# Patient Record
Sex: MALE | Race: WHITE | Employment: UNEMPLOYED | URBAN - METROPOLITAN AREA
[De-identification: names, ages, dates, MRNs, and addresses within clinical notes are randomized per-mention and may not be internally consistent; named-entity substitution may affect disease eponyms.]

---

## 2017-02-07 ENCOUNTER — OFFICE VISIT (OUTPATIENT)
Dept: PSYCHOLOGY | Facility: CLINIC | Age: 22
End: 2017-02-07
Payer: COMMERCIAL

## 2017-02-07 DIAGNOSIS — F41.1 GENERALIZED ANXIETY DISORDER: Primary | ICD-10-CM

## 2017-02-07 PROCEDURE — 90834 PSYTX W PT 45 MINUTES: CPT | Performed by: MARRIAGE & FAMILY THERAPIST

## 2017-02-07 NOTE — PROGRESS NOTES
Progress Note    Client Name: Keon Davis  Date: February 7, 2017                 Service Type: Individual      Session Start Time: 12:00  Session End Time: 12:45      Session Length: 45 min     Session #: 15     Attendees: Client attended alone    Treatment Plan Last Reviewed: February 7, 2017  SIOBHAN-7 : assessed regularly see flow sheets     DATA      Progress Since Last Session (Related to Symptoms / Goals / Homework):   Symptoms: Improved    Homework: Achieved / completed to satisfaction      Episode of Care Goals: Satisfactory progress - ACTION (Actively working towards change); Intervened by reinforcing change plan / affirming steps taken     Current / Ongoing Stressors and Concerns:   Stressful job - relationship conflict  - hypervigilance     Treatment Objective(s) Addressed in This Session:   Client will learn to anticipate and manage anxiety emotions and thoughts.     Intervention:  Reviewed emotion regulation skills. Client reports he successfully weaned off of his SSRI mediation. He said he has begun doing 10-15 minutes of medication every day and is finding this very helpful in lowering his anxiety symptoms. Processed emotions in session, validated, supportive counseling.     ASSESSMENT: Current Emotional / Mental Status (status of significant symptoms):   Risk status (Self / Other harm or suicidal ideation)   Client denies current fears or concerns for personal safety.   Client denies current or recent suicidal ideation or behaviors.   Client denies current or recent homicidal ideation or behaviors.   Client denies current or recent self injurious behavior or ideation.   Client denies other safety concerns.   A safety and risk management plan has not been developed at this time, however client was given the after-hours number / 911 should there be a change in any of these risk factors.     Appearance:   Appropriate    Eye Contact:   Good     Psychomotor Behavior: Normal    Attitude:   Cooperative    Orientation:   All   Speech    Rate / Production: Normal     Volume:  Normal    Mood:    Anxious    Affect:    Appropriate    Thought Content:  Clear    Thought Form:  Coherent  Logical    Insight:    Good      Medication Review:   No current psychiatric medications prescribed     Medication Compliance:   NA     Changes in Health Issues:   None reported     Chemical Use Review:   Substance Use: Chemical use reviewed, no active concerns identified      Tobacco Use: No current tobacco use.       Collateral Reports Completed:   Not Applicable    PLAN: (Client Tasks / Therapist Tasks / Other)  Client will use skills daily to maintain improvement.      Amrit Purcell MA, LMFT                                                       ________________________________________________________________________                                                 Treatment Plan    Client's Name: Keon Davis  YOB: 1995    Date: February 7, 2017      DSM-V Diagnoses: 300.02 - Generalized Anxiety Disorder  ; V71.09 - No Diagnosis  Psychosocial / Contextual Factors: relational conflict      Referral / Collaboration:  Referral to another professional/service is not indicated at this time..    Anticipated number of session or this episode of care: 26      MeasurableTreatment Goal(s) related to diagnosis / functional impairment(s)   I will know I've met my goal when I learn tools to cope with and lower my anxiety.     Goal 1: Client will experience a reduction in SIOBHAN-7 scores to 5 or below   Objective #A (Client Action)   Client will learn to anticipate and manage anxiety emotions and thoughts. .   Status: Continued: February 7, 2017  Intervention(s)   Therapist will teach acceptance strategies and emotion regulation skills to be more flexible psychologically when the anxiety is present. .   Objective #B   Client will learn to identify negative thoughts that  are present, related to anxiety.   Status: Continued: February 7, 2017  Intervention(s)   Therapist will 1. teach about cognitive distortions and 2. encourage client to daily identify one and write it down.  Objective #C   Client will learn ways to manage the thought distortions that are present.   Status: Continued: February 7, 2017  Intervention(s)   Therapist will 1. teach cognitive restructuring techniques for catastrophic thoughts and 2. diffusion for healthly anxiety thoughts.   Client has reviewed and agreed to the above plan.     Client has reviewed and agreed to the above plan.      Amrit Purcell MA, LMFT Continued: February 7, 2017

## 2017-03-21 ENCOUNTER — OFFICE VISIT (OUTPATIENT)
Dept: PSYCHOLOGY | Facility: CLINIC | Age: 22
End: 2017-03-21
Payer: COMMERCIAL

## 2017-03-21 DIAGNOSIS — F41.1 GENERALIZED ANXIETY DISORDER: Primary | ICD-10-CM

## 2017-03-21 PROCEDURE — 90834 PSYTX W PT 45 MINUTES: CPT | Performed by: MARRIAGE & FAMILY THERAPIST

## 2017-03-21 ASSESSMENT — ANXIETY QUESTIONNAIRES
1. FEELING NERVOUS, ANXIOUS, OR ON EDGE: SEVERAL DAYS
2. NOT BEING ABLE TO STOP OR CONTROL WORRYING: SEVERAL DAYS
3. WORRYING TOO MUCH ABOUT DIFFERENT THINGS: SEVERAL DAYS
6. BECOMING EASILY ANNOYED OR IRRITABLE: NOT AT ALL
IF YOU CHECKED OFF ANY PROBLEMS ON THIS QUESTIONNAIRE, HOW DIFFICULT HAVE THESE PROBLEMS MADE IT FOR YOU TO DO YOUR WORK, TAKE CARE OF THINGS AT HOME, OR GET ALONG WITH OTHER PEOPLE: SOMEWHAT DIFFICULT
7. FEELING AFRAID AS IF SOMETHING AWFUL MIGHT HAPPEN: SEVERAL DAYS
GAD7 TOTAL SCORE: 6
5. BEING SO RESTLESS THAT IT IS HARD TO SIT STILL: SEVERAL DAYS

## 2017-03-21 ASSESSMENT — PATIENT HEALTH QUESTIONNAIRE - PHQ9: 5. POOR APPETITE OR OVEREATING: SEVERAL DAYS

## 2017-03-21 NOTE — PROGRESS NOTES
Progress Note    Client Name: Keon Davis  Date: March 21, 2017                  Service Type: Individual      Session Start Time: 12:00  Session End Time: 12:45      Session Length: 45 min     Session #: 16     Attendees: Client attended alone    Treatment Plan Last Reviewed: February 7, 2017  SIOBHAN-7 : assessed regularly see flow sheets     DATA      Progress Since Last Session (Related to Symptoms / Goals / Homework):   Symptoms: Improved    Homework: Achieved / completed to satisfaction      Episode of Care Goals: Satisfactory progress - MAINTENANCE (Working to maintain change, with risk of relapse); Intervened by continuing to positively reinforce healthy behavior choice      Current / Ongoing Stressors and Concerns:   Stressful job - relationship conflict  - hypervigilance     Treatment Objective(s) Addressed in This Session:   Client will learn to anticipate and manage anxiety emotions and thoughts.     Intervention:  Reviewed self-care goals. Client reports he is generally quite improved. He says he is stressed by work (very busy) and his coming wedding in October. He says his self-care is strong, with daily mediation for the past 2 months. Client will return in 4 weks to maintain improvement. Processed emotions in session, validated, supportive counseling.     ASSESSMENT: Current Emotional / Mental Status (status of significant symptoms):   Risk status (Self / Other harm or suicidal ideation)   Client denies current fears or concerns for personal safety.   Client denies current or recent suicidal ideation or behaviors.   Client denies current or recent homicidal ideation or behaviors.   Client denies current or recent self injurious behavior or ideation.   Client denies other safety concerns.   A safety and risk management plan has not been developed at this time, however client was given the after-hours number / 911 should there be a change in any of  these risk factors.     Appearance:   Appropriate    Eye Contact:   Good    Psychomotor Behavior: Normal    Attitude:   Cooperative    Orientation:   All   Speech    Rate / Production: Normal     Volume:  Normal    Mood:    Anxious    Affect:    Appropriate    Thought Content:  Clear    Thought Form:  Coherent  Logical    Insight:    Good      Medication Review:   No current psychiatric medications prescribed     Medication Compliance:   NA     Changes in Health Issues:   None reported     Chemical Use Review:   Substance Use: Chemical use reviewed, no active concerns identified      Tobacco Use: No current tobacco use.       Collateral Reports Completed:   Not Applicable    PLAN: (Client Tasks / Therapist Tasks / Other)  Client will continue strong self-care performance, including meditation.        Amrit Purcell MA, LMFT                                                       ________________________________________________________________________                                                 Treatment Plan    Client's Name: Keon Davis  YOB: 1995    Date: February 7, 2017      DSM-V Diagnoses: 300.02 - Generalized Anxiety Disorder  ; V71.09 - No Diagnosis  Psychosocial / Contextual Factors: relational conflict      Referral / Collaboration:  Referral to another professional/service is not indicated at this time..    Anticipated number of session or this episode of care: 26      MeasurableTreatment Goal(s) related to diagnosis / functional impairment(s)   I will know I've met my goal when I learn tools to cope with and lower my anxiety.     Goal 1: Client will experience a reduction in SIOBHAN-7 scores to 5 or below   Objective #A (Client Action)   Client will learn to anticipate and manage anxiety emotions and thoughts. .   Status: Continued: February 7, 2017  Intervention(s)   Therapist will teach acceptance strategies and emotion regulation skills to be more flexible psychologically when  the anxiety is present. .   Objective #B   Client will learn to identify negative thoughts that are present, related to anxiety.   Status: Continued: February 7, 2017  Intervention(s)   Therapist will 1. teach about cognitive distortions and 2. encourage client to daily identify one and write it down.  Objective #C   Client will learn ways to manage the thought distortions that are present.   Status: Continued: February 7, 2017  Intervention(s)   Therapist will 1. teach cognitive restructuring techniques for catastrophic thoughts and 2. diffusion for healthly anxiety thoughts.   Client has reviewed and agreed to the above plan.     Client has reviewed and agreed to the above plan.      Amrit Purcell MA, LMFT Continued: February 7, 2017

## 2017-03-22 ASSESSMENT — ANXIETY QUESTIONNAIRES: GAD7 TOTAL SCORE: 6

## 2017-04-18 ENCOUNTER — OFFICE VISIT (OUTPATIENT)
Dept: PSYCHOLOGY | Facility: CLINIC | Age: 22
End: 2017-04-18
Payer: COMMERCIAL

## 2017-04-18 DIAGNOSIS — F41.1 GENERALIZED ANXIETY DISORDER: Primary | ICD-10-CM

## 2017-04-18 PROCEDURE — 90834 PSYTX W PT 45 MINUTES: CPT | Performed by: MARRIAGE & FAMILY THERAPIST

## 2017-04-18 NOTE — MR AVS SNAPSHOT
MRN:[id number]                      After Visit Summary   4/18/2017    Keon Davis    MRN: [id number]           Visit Information        Provider Department      4/18/2017 12:00 PM Donald Purcell [facility]      Your next 10 appointments already scheduled     Apr 20, 2017 10:45 AM CDT   SHORT with EMRE Wolf CNP   [facility] ([facility])    [address] [place] [id number]   [phone]            May 02, 2017 12:00 PM CDT   Return Visit with Donald Purcell   [facility] ([facility])    [address] [place] [address] [address] [place] [address] [phone]            May 16, 2017 12:00 PM CDT   Return Visit with Donald Purcell   [facility] ([facility])    [address] [place] [address] [address] [place] [address] [phone]            May 30, 2017 12:00 PM CDT   Return Visit with Donald Purcell   [facility] ([facility])    [address] [place] [address] [address] [place] [address] [phone]            Jun 13, 2017 12:00 PM CDT   Return Visit with Donald Purcell   [facility] ([facility])    [address] [place] [address] [address] [place] [address]   [phone]            Jun 27, 2017 12:00 PM CDT   Return Visit with Donald Purcell   [facility] ([facility])    [address] [place] [address] [address] [place] [address] [phone]              MyChart Information     [facility] gives you secure access to your electronic health record. If you see a primary care provider, you can also send messages to your care team and make appointments. If you have questions, please call your primary care clinic.  If you do not have a primary care provider, please call  [phone] and they will assist you.        Care EveryWhere ID     This is your Care EveryWhere ID. This could be used by other organizations to access your [facility] medical records  [id number]

## 2017-04-18 NOTE — PROGRESS NOTES
Progress Note    Client Name: Keon Davis  Date: April 18, 2017                   Service Type: Individual      Session Start Time: 12:00  Session End Time: 12:45      Session Length: 45 min     Session #: 17     Attendees: Client attended alone    Treatment Plan Last Reviewed: February 7, 2017  SIOBHAN-7 : assessed regularly see flow sheets     DATA      Progress Since Last Session (Related to Symptoms / Goals / Homework):   Symptoms: Improved    Homework: Achieved / completed to satisfaction      Episode of Care Goals: Satisfactory progress - MAINTENANCE (Working to maintain change, with risk of relapse); Intervened by continuing to positively reinforce healthy behavior choice      Current / Ongoing Stressors and Concerns:   Stressful job - relationship conflict  - hypervigilance     Treatment Objective(s) Addressed in This Session:   Client will learn to anticipate and manage anxiety emotions and thoughts.     Intervention:  Reviewed interpersonal effectiveness skills. Client reports he is feeling some low mood symptoms, which are probably related to a two week illness (cold). In addition, he says his best friend has been angry with him for not spending as much time with him. We discussed social relationship and time management goals. Processed emotions in session, validated, supportive counseling.     ASSESSMENT: Current Emotional / Mental Status (status of significant symptoms):   Risk status (Self / Other harm or suicidal ideation)   Client denies current fears or concerns for personal safety.   Client denies current or recent suicidal ideation or behaviors.   Client denies current or recent homicidal ideation or behaviors.   Client denies current or recent self injurious behavior or ideation.   Client denies other safety concerns.   A safety and risk management plan has not been developed at this time, however client was given the after-hours number / 911  should there be a change in any of these risk factors.     Appearance:   Appropriate    Eye Contact:   Good    Psychomotor Behavior: Normal    Attitude:   Cooperative    Orientation:   All   Speech    Rate / Production: Normal     Volume:  Normal    Mood:    Anxious    Affect:    Appropriate    Thought Content:  Clear    Thought Form:  Coherent  Logical    Insight:    Good      Medication Review:   No current psychiatric medications prescribed     Medication Compliance:   NA     Changes in Health Issues:   None reported     Chemical Use Review:   Substance Use: Chemical use reviewed, no active concerns identified      Tobacco Use: No current tobacco use.       Collateral Reports Completed:   Not Applicable    PLAN: (Client Tasks / Therapist Tasks / Other)  Client will continue strong self-care performance, including meditation.        Amrit Purcell MA, LMFT                                                       ________________________________________________________________________                                                 Treatment Plan    Client's Name: Keon Davis  YOB: 1995    Date: February 7, 2017      DSM-V Diagnoses: 300.02 - Generalized Anxiety Disorder  ; V71.09 - No Diagnosis  Psychosocial / Contextual Factors: relational conflict      Referral / Collaboration:  Referral to another professional/service is not indicated at this time..    Anticipated number of session or this episode of care: 26      MeasurableTreatment Goal(s) related to diagnosis / functional impairment(s)   I will know I've met my goal when I learn tools to cope with and lower my anxiety.     Goal 1: Client will experience a reduction in SIOBHAN-7 scores to 5 or below   Objective #A (Client Action)   Client will learn to anticipate and manage anxiety emotions and thoughts. .   Status: Continued: February 7, 2017  Intervention(s)   Therapist will teach acceptance strategies and emotion regulation skills to be  more flexible psychologically when the anxiety is present. .   Objective #B   Client will learn to identify negative thoughts that are present, related to anxiety.   Status: Continued: February 7, 2017  Intervention(s)   Therapist will 1. teach about cognitive distortions and 2. encourage client to daily identify one and write it down.  Objective #C   Client will learn ways to manage the thought distortions that are present.   Status: Continued: February 7, 2017  Intervention(s)   Therapist will 1. teach cognitive restructuring techniques for catastrophic thoughts and 2. diffusion for healthly anxiety thoughts.   Client has reviewed and agreed to the above plan.     Client has reviewed and agreed to the above plan.      Amrit Purcell MA, LMFT Continued: February 7, 2017

## 2017-04-19 NOTE — PROGRESS NOTES
SUBJECTIVE:                                                    Keon Davis is a 21 year old male who presents to clinic today for the following health issues:    Acute Illness   Acute illness concerns: sore throat  Onset: 2 weeks    Fever: no    Chills/Sweats: YES- just on Sunday    Headache (location?): YES    Sinus Pressure:no    Conjunctivitis:  no    Ear Pain: no    Rhinorrhea: YES- a little bit    Congestion: YES- kind of    Sore Throat: YES     Cough: YES    Wheeze: YES- a little bit    Decreased Appetite: YES    Nausea: no    Vomiting: no    Diarrhea:  no    Dysuria/Freq.: no    Fatigue/Achiness: YES- more achy    Sick/Strep Exposure: no      Therapies Tried and outcome: Dayquil and Nyquil, little relief      -------------------------------------    Problem list and histories reviewed & adjusted, as indicated.  Additional history: as documented    Patient Active Problem List   Diagnosis     CARDIOVASCULAR SCREENING; LDL GOAL LESS THAN 160     Adjustment reaction     SIOBHAN (generalized anxiety disorder)     No past surgical history on file.    Social History   Substance Use Topics     Smoking status: Never Smoker     Smokeless tobacco: Never Used     Alcohol use Yes      Comment: 5 beers per week     Family History   Problem Relation Age of Onset     CANCER Paternal Grandmother          Current Outpatient Prescriptions   Medication Sig Dispense Refill     benzonatate (TESSALON) 200 MG capsule Take 1 capsule (200 mg) by mouth 3 times daily as needed for cough 21 capsule 0     fluticasone (FLONASE) 50 MCG/ACT nasal spray Spray 2 sprays into both nostrils daily       citalopram (CELEXA) 40 MG tablet Take one pill daily by mouth (Patient not taking: Reported on 4/20/2017) 30 tablet 3     fexofenadine (ALLEGRA) 180 MG tablet Take 180 mg by mouth daily Reported on 4/20/2017       DiphenhydrAMINE HCl (BENADRYL ALLERGY PO) Reported on 4/20/2017         Reviewed and updated as needed this visit by clinical  "staff       Reviewed and updated as needed this visit by Provider         ROS:  Constitutional, HEENT, cardiovascular, pulmonary, gi and gu systems are negative, except as otherwise noted.    OBJECTIVE:                                                    /70  Pulse 85  Temp 98.3  F (36.8  C) (Oral)  Ht 5' 9\" (1.753 m)  Wt 151 lb 4.8 oz (68.6 kg)  SpO2 100%  BMI 22.34 kg/m2  Body mass index is 22.34 kg/(m^2).  GENERAL: healthy, alert and no distress  HENT: ear canals and TM's normal, nose and mouth without ulcers or lesions  NECK: bilateral anterior cervical adenopathy, no asymmetry, masses, or scars and thyroid normal to palpation  RESP: lungs clear to auscultation - no rales, rhonchi or wheezes  CV: regular rate and rhythm, normal S1 S2, no S3 or S4, no murmur, click or rub, no peripheral edema and peripheral pulses strong    Diagnostic Test Results:  Results for orders placed or performed in visit on 04/20/17 (from the past 24 hour(s))   Strep, Rapid Screen   Result Value Ref Range    Specimen Description Throat     Rapid Strep A Screen       NEGATIVE: No Group A streptococcal antigen detected by immunoassay, await   culture report.      Micro Report Status FINAL 04/20/2017         ASSESSMENT/PLAN:                                                      Problem List Items Addressed This Visit     None      Visit Diagnoses     Throat pain    -  Primary    Relevant Orders    Strep, Rapid Screen (Completed)    Beta strep group A culture (Completed)    Cough        Relevant Medications    benzonatate (TESSALON) 200 MG capsule         Emphasized rest and fluids  EMRE Wolf CNP  [facility]    "

## 2017-04-20 ENCOUNTER — OFFICE VISIT (OUTPATIENT)
Dept: FAMILY MEDICINE | Facility: CLINIC | Age: 22
End: 2017-04-20
Payer: COMMERCIAL

## 2017-04-20 VITALS
OXYGEN SATURATION: 100 % | HEIGHT: 69 IN | BODY MASS INDEX: 22.41 KG/M2 | HEART RATE: 85 BPM | WEIGHT: 151.3 LBS | DIASTOLIC BLOOD PRESSURE: 70 MMHG | TEMPERATURE: 98.3 F | SYSTOLIC BLOOD PRESSURE: 132 MMHG

## 2017-04-20 DIAGNOSIS — R05.9 COUGH: ICD-10-CM

## 2017-04-20 DIAGNOSIS — R07.0 THROAT PAIN: Primary | ICD-10-CM

## 2017-04-20 LAB
DEPRECATED S PYO AG THROAT QL EIA: NORMAL
MICRO REPORT STATUS: NORMAL
SPECIMEN SOURCE: NORMAL

## 2017-04-20 PROCEDURE — 99213 OFFICE O/P EST LOW 20 MIN: CPT | Performed by: NURSE PRACTITIONER

## 2017-04-20 PROCEDURE — 87081 CULTURE SCREEN ONLY: CPT | Performed by: NURSE PRACTITIONER

## 2017-04-20 PROCEDURE — 87880 STREP A ASSAY W/OPTIC: CPT | Performed by: NURSE PRACTITIONER

## 2017-04-20 RX ORDER — BENZONATATE 200 MG/1
200 CAPSULE ORAL 3 TIMES DAILY PRN
Qty: 21 CAPSULE | Refills: 0 | Status: SHIPPED | OUTPATIENT
Start: 2017-04-20 | End: 2024-01-15

## 2017-04-20 NOTE — NURSING NOTE
"Chief Complaint   Patient presents with     Pharyngitis       Initial /70  Pulse 85  Temp 98.3  F (36.8  C) (Oral)  Ht 5' 9\" (1.753 m)  Wt 151 lb 4.8 oz (68.6 kg)  SpO2 100%  BMI 22.34 kg/m2 Estimated body mass index is 22.34 kg/(m^2) as calculated from the following:    Height as of this encounter: 5' 9\" (1.753 m).    Weight as of this encounter: 151 lb 4.8 oz (68.6 kg).  Medication Reconciliation: complete     Lizzie Hills MA      "

## 2017-04-20 NOTE — MR AVS SNAPSHOT
After Visit Summary   4/20/2017    Keon Davis    MRN: [id number]           Patient Information     Date Of Birth          1995        Visit Information        Provider Department      4/20/2017 10:45 AM Miriam Draper APRN [facility]        Today's Diagnoses     Throat pain    -  1    Cough           Follow-ups after your visit        Your next 10 appointments already scheduled     May 02, 2017 12:00 PM CDT   Return Visit with Donald Purcell   [facility] ([facility])    [address] [place] [address] [address] [place] [address]   [phone]            May 16, 2017 12:00 PM CDT   Return Visit with Donald Purcell   [facility] ([facility])    [address] [place] [address] [address] [place] [address] [phone]            May 30, 2017 12:00 PM CDT   Return Visit with Donald Purcell   [facility] ([facility])    [address] [place] [address] [address] [place] [id number] [phone]            Jun 13, 2017 12:00 PM CDT   Return Visit with Donald Purcell   [facility] ([facility])    [address] [place] [address] [address] [place] [id number] [phone]            Jun 27, 2017 12:00 PM CDT   Return Visit with Donald Purcell   [facility] ([facility])    [address] [place] [address] [address] [place] [id number] [phone]              Who to contact     If you have questions or need follow up information about today's clinic visit or your schedule please contact [facility] directly at [phone].  Normal or non-critical lab and imaging results will be communicated to you by MyChart, letter or phone within 4 business days after the clinic has received the results. If you do not hear from us within 7 days, please contact the  "clinic through [employer] or phone. If you have a critical or abnormal lab result, we will notify you by phone as soon as possible.  Submit refill requests through [employer] or call your pharmacy and they will forward the refill request to us. Please allow 3 business days for your refill to be completed.          Additional Information About Your Visit        [employer] Information     [employer] gives you secure access to your electronic health record. If you see a primary care provider, you can also send messages to your care team and make appointments. If you have questions, please call your primary care clinic.  If you do not have a primary care provider, please call [phone] and they will assist you.        Care EveryWhere ID     This is your Care EveryWhere ID. This could be used by other organizations to access your [place] medical records  [id number]        Your Vitals Were     Pulse Temperature Height Pulse Oximetry BMI (Body Mass Index)       85 98.3  F (36.8  C) (Oral) 5' 9\" (1.753 m) 100% 22.34 kg/m2        Blood Pressure from Last 3 Encounters:   04/20/17 132/70   10/11/16 125/65   07/14/16 130/73    Weight from Last 3 Encounters:   04/20/17 151 lb 4.8 oz (68.6 kg)   10/11/16 146 lb (66.2 kg)   07/14/16 148 lb 8 oz (67.4 kg)              We Performed the Following     Beta strep group A culture     Strep, Rapid Screen          Today's Medication Changes          These changes are accurate as of: 4/20/17 11:38 AM.  If you have any questions, ask your nurse or doctor.               Start taking these medicines.        Dose/Directions    benzonatate 200 MG capsule   Commonly known as:  TESSALON   Used for:  Cough   Started by:  Miriam Draper APRN CNP        Dose:  200 mg   Take 1 capsule (200 mg) by mouth 3 times daily as needed for cough   Quantity:  21 capsule   Refills:  0            Where to get your medicines      These medications were sent to [facility]/pharmacy #[id number] - [place], [place] - [address] [place] [address] " [place] [id number] [facility] [address]     Phone:  [phone]     benzonatate 200 MG capsule                Primary Care Provider    None Specified       No primary provider on file.        Thank you!     Thank you for choosing [facility]  for your care. Our goal is always to provide you with excellent care. Hearing back from our patients is one way we can continue to improve our services. Please take a few minutes to complete the written survey that you may receive in the mail after your visit with us. Thank you!             Your Updated Medication List - Protect others around you: Learn how to safely use, store and throw away your medicines at www.disposemymeds.org.          This list is accurate as of: 4/20/17 11:38 AM.  Always use your most recent med list.                   Brand Name Dispense Instructions for use    BENADRYL ALLERGY PO      Reported on 4/20/2017       benzonatate 200 MG capsule    TESSALON    21 capsule    Take 1 capsule (200 mg) by mouth 3 times daily as needed for cough       citalopram 40 MG tablet    celeXA    30 tablet    Take one pill daily by mouth       fexofenadine 180 MG tablet    ALLEGRA     Take 180 mg by mouth daily Reported on 4/20/2017       fluticasone 50 MCG/ACT spray    FLONASE     Spray 2 sprays into both nostrils daily

## 2017-04-21 LAB
BACTERIA SPEC CULT: NORMAL
MICRO REPORT STATUS: NORMAL
SPECIMEN SOURCE: NORMAL

## 2017-05-16 ENCOUNTER — OFFICE VISIT (OUTPATIENT)
Dept: PSYCHOLOGY | Facility: CLINIC | Age: 22
End: 2017-05-16
Payer: COMMERCIAL

## 2017-05-16 DIAGNOSIS — F41.1 GENERALIZED ANXIETY DISORDER: Primary | ICD-10-CM

## 2017-05-16 PROCEDURE — 90834 PSYTX W PT 45 MINUTES: CPT | Performed by: MARRIAGE & FAMILY THERAPIST

## 2017-05-16 NOTE — PROGRESS NOTES
Progress Note    Client Name: Keon Davis  Date: May 16, 2017                    Service Type: Individual      Session Start Time: 12:00  Session End Time: 12:45      Session Length: 45 min     Session #: 18     Attendees: Client attended alone    Treatment Plan Last Reviewed: February 7, 2017  SIOBHAN-7 : assessed regularly see flow sheets     DATA      Progress Since Last Session (Related to Symptoms / Goals / Homework):   Symptoms: Improved    Homework: Achieved / completed to satisfaction      Episode of Care Goals: Satisfactory progress - MAINTENANCE (Working to maintain change, with risk of relapse); Intervened by continuing to positively reinforce healthy behavior choice      Current / Ongoing Stressors and Concerns:   Stressful job - relationship conflict  - hypervigilance     Treatment Objective(s) Addressed in This Session:   Client will learn to anticipate and manage anxiety emotions and thoughts.     Intervention:  Reviewed skills and self-care that have been helpful in gaining and maintaining improvement.. Client reports he has meditating now for 100 days and has found this very effective in lowering his anxiety. Processed emotions in session, validated, supportive counseling.     ASSESSMENT: Current Emotional / Mental Status (status of significant symptoms):   Risk status (Self / Other harm or suicidal ideation)   Client denies current fears or concerns for personal safety.   Client denies current or recent suicidal ideation or behaviors.   Client denies current or recent homicidal ideation or behaviors.   Client denies current or recent self injurious behavior or ideation.   Client denies other safety concerns.   A safety and risk management plan has not been developed at this time, however client was given the after-hours number / 911 should there be a change in any of these risk factors.     Appearance:   Appropriate    Eye Contact:   Good     Psychomotor Behavior: Normal    Attitude:   Cooperative    Orientation:   All   Speech    Rate / Production: Normal     Volume:  Normal    Mood:    Anxious    Affect:    Appropriate    Thought Content:  Clear    Thought Form:  Coherent  Logical    Insight:    Good      Medication Review:   No current psychiatric medications prescribed     Medication Compliance:   NA     Changes in Health Issues:   None reported     Chemical Use Review:   Substance Use: Chemical use reviewed, no active concerns identified      Tobacco Use: No current tobacco use.       Collateral Reports Completed:   Not Applicable    PLAN: (Client Tasks / Therapist Tasks / Other)  Client will continue strong self-care performance, including meditation.        Amrit Purcell MA, LMFT                                                       ________________________________________________________________________                                                 Treatment Plan    Client's Name: Keon Davis  YOB: 1995    Date: February 7, 2017      DSM-V Diagnoses: 300.02 - Generalized Anxiety Disorder  ; V71.09 - No Diagnosis  Psychosocial / Contextual Factors: relational conflict      Referral / Collaboration:  Referral to another professional/service is not indicated at this time..    Anticipated number of session or this episode of care: 26      MeasurableTreatment Goal(s) related to diagnosis / functional impairment(s)   I will know I've met my goal when I learn tools to cope with and lower my anxiety.     Goal 1: Client will experience a reduction in SIOBHAN-7 scores to 5 or below   Objective #A (Client Action)   Client will learn to anticipate and manage anxiety emotions and thoughts. .   Status: Continued: February 7, 2017  Intervention(s)   Therapist will teach acceptance strategies and emotion regulation skills to be more flexible psychologically when the anxiety is present. .   Objective #B   Client will learn to identify  negative thoughts that are present, related to anxiety.   Status: Continued: February 7, 2017  Intervention(s)   Therapist will 1. teach about cognitive distortions and 2. encourage client to daily identify one and write it down.  Objective #C   Client will learn ways to manage the thought distortions that are present.   Status: Continued: February 7, 2017  Intervention(s)   Therapist will 1. teach cognitive restructuring techniques for catastrophic thoughts and 2. diffusion for healthly anxiety thoughts.   Client has reviewed and agreed to the above plan.     Client has reviewed and agreed to the above plan.      Amrit Purcell MA, LMFT Continued: February 7, 2017

## 2017-05-16 NOTE — MR AVS SNAPSHOT
MRN:[id number]                      After Visit Summary   5/16/2017    Keon aDvis    MRN: [id number]           Visit Information        Provider Department      5/16/2017 12:00 PM Donald Purcell [facility]      Your next 10 appointments already scheduled     May 30, 2017 12:00 PM CDT   Return Visit with Donald Purcell   [facility] ([facility])    [address] [place] [address] [address] [place] [address]   [phone]            Jun 13, 2017 12:00 PM CDT   Return Visit with Donald Purcell   [facility] ([facility])    [address] [place] [address] [place] [address]   [phone]            Jun 27, 2017 12:00 PM CDT   Return Visit with Donald Purcell   [facility] ([facility])    [address] [place] [address] [place] [address] [phone]            Jul 11, 2017 12:00 PM CDT   Return Visit with Donald Purcell   [facility] ([facility])    [address] [place] [address] [address] [place] [address]   [phone]            Jul 25, 2017 12:00 PM CDT   Return Visit with Donald Purcell   [facility] ([facility])    [address] [place] [address] [address] [place] [address]   [phone]              MyChart Information     [employer] gives you secure access to your electronic health record. If you see a primary care provider, you can also send messages to your care team and make appointments. If you have questions, please call your primary care clinic.  If you do not have a primary care provider, please call [phone] and they will assist you.        Care EveryWhere ID     This is your Care EveryWhere ID. This could be used by other organizations to access your [place] medical records  [id number]

## 2017-06-27 ENCOUNTER — OFFICE VISIT (OUTPATIENT)
Dept: PSYCHOLOGY | Facility: CLINIC | Age: 22
End: 2017-06-27
Payer: COMMERCIAL

## 2017-06-27 DIAGNOSIS — F41.1 GENERALIZED ANXIETY DISORDER: Primary | ICD-10-CM

## 2017-06-27 PROCEDURE — 90834 PSYTX W PT 45 MINUTES: CPT | Performed by: MARRIAGE & FAMILY THERAPIST

## 2017-06-27 ASSESSMENT — ANXIETY QUESTIONNAIRES
1. FEELING NERVOUS, ANXIOUS, OR ON EDGE: SEVERAL DAYS
IF YOU CHECKED OFF ANY PROBLEMS ON THIS QUESTIONNAIRE, HOW DIFFICULT HAVE THESE PROBLEMS MADE IT FOR YOU TO DO YOUR WORK, TAKE CARE OF THINGS AT HOME, OR GET ALONG WITH OTHER PEOPLE: SOMEWHAT DIFFICULT
GAD7 TOTAL SCORE: 5
5. BEING SO RESTLESS THAT IT IS HARD TO SIT STILL: NOT AT ALL
2. NOT BEING ABLE TO STOP OR CONTROL WORRYING: SEVERAL DAYS
6. BECOMING EASILY ANNOYED OR IRRITABLE: SEVERAL DAYS
7. FEELING AFRAID AS IF SOMETHING AWFUL MIGHT HAPPEN: NOT AT ALL
3. WORRYING TOO MUCH ABOUT DIFFERENT THINGS: SEVERAL DAYS

## 2017-06-27 ASSESSMENT — PATIENT HEALTH QUESTIONNAIRE - PHQ9: 5. POOR APPETITE OR OVEREATING: SEVERAL DAYS

## 2017-06-27 NOTE — MR AVS SNAPSHOT
MRN:[id number]                      After Visit Summary   6/27/2017    Keon Davis    MRN: [id number]           Visit Information        Provider Department      6/27/2017 12:00 PM [facility]cell [facility]      Your next 10 appointments already scheduled     Jul 11, 2017 12:00 PM CDT   Return Visit with Donald Purcell   [facility] ([facility])    [address] [place] [address] [place] [address]   [phone]            Jul 25, 2017 12:00 PM CDT   Return Visit with Donald Purcell   [facility] ([facility])    [address] [place] [address] [place] [address]   [phone]            Aug 08, 2017 12:00 PM CDT   Return Visit with Donald Purcell   [facility] ([facility])    [address] [place] [address] [place] [address]   [phone]            Aug 22, 2017 12:00 PM CDT   Return Visit with Donald Purcell   [facility] ([facility])    [address] [place] [address] [place] [address]   [phone]            Sep 05, 2017 12:00 PM CDT   Return Visit with Donald Purcell   [facility] ([facility])    [address] [place] [address] [place] [address]   [phone]            Sep 19, 2017 12:00 PM CDT   Return Visit with Donald Purcell   [facility] ([facility])    [address] [place] [address] [place] [address]   [phone]              MyChart Information     [employer] gives you secure access to your electronic health record. If you see a primary care provider, you can also send messages to your care team and make appointments. If you have questions, please call your primary care clinic.  If you do not have a primary care provider, please  call [phone] and they will assist you.        Care EveryWhere ID     This is your Care EveryWhere ID. This could be used by other organizations to access your [place] medical records  [id number]        Equal Access to Services     ALTON SCHWARTZ: Katie Silva, doris salvador, omar kaalmelonie leija, eva schwartz. So [facility] [phone].    ATENCIÓN: Si habla español, tiene a singer disposición servicios gratuitos de asistencia lingüística. Llame al [phone].    We comply with applicable federal civil rights laws and [place] laws. We do not discriminate on the basis of race, color, national origin, age, disability sex, sexual orientation or gender identity.

## 2017-06-27 NOTE — PROGRESS NOTES
Progress Note    Client Name: Keon Davis  Date: June 27, 2017             Service Type: Individual      Session Start Time: 12:00  Session End Time: 12:45      Session Length: 45 min     Session #: 19     Attendees: Client attended alone    Treatment Plan Last Reviewed: June 27, 2017  SIOBHAN-7 : assessed regularly see flow sheets     DATA      Progress Since Last Session (Related to Symptoms / Goals / Homework):   Symptoms: Improved    Homework: Achieved / completed to satisfaction      Episode of Care Goals: Satisfactory progress - MAINTENANCE (Working to maintain change, with risk of relapse); Intervened by continuing to positively reinforce healthy behavior choice      Current / Ongoing Stressors and Concerns:   Stressful job - relationship conflict  - hypervigilance     Treatment Objective(s) Addressed in This Session:   Client will learn to anticipate and manage anxiety emotions and thoughts.     Intervention:  Reviewed skills and self-care that have been helpful in gaining and maintaining improvement.. Client reports he has meditating now for several months and has found this very effective in lowering his anxiety. Processed emotions in session, validated, supportive counseling.     ASSESSMENT: Current Emotional / Mental Status (status of significant symptoms):   Risk status (Self / Other harm or suicidal ideation)   Client denies current fears or concerns for personal safety.   Client denies current or recent suicidal ideation or behaviors.   Client denies current or recent homicidal ideation or behaviors.   Client denies current or recent self injurious behavior or ideation.   Client denies other safety concerns.   A safety and risk management plan has not been developed at this time, however client was given the after-hours number / 911 should there be a change in any of these risk factors.     Appearance:   Appropriate    Eye Contact:   Good     Psychomotor Behavior: Normal    Attitude:   Cooperative    Orientation:   All   Speech    Rate / Production: Normal     Volume:  Normal    Mood:    Anxious    Affect:    Appropriate    Thought Content:  Clear    Thought Form:  Coherent  Logical    Insight:    Good      Medication Review:   No current psychiatric medications prescribed     Medication Compliance:   NA     Changes in Health Issues:   None reported     Chemical Use Review:   Substance Use: Chemical use reviewed, no active concerns identified      Tobacco Use: No current tobacco use.       Collateral Reports Completed:   Not Applicable    PLAN: (Client Tasks / Therapist Tasks / Other)  Client will continue strong self-care performance, including meditation.        Amrit Purcell MA, LMFT                                                       ________________________________________________________________________                                                 Treatment Plan    Client's Name: Keon Davis  YOB: 1995    Date: June 27, 2017      DSM-V Diagnoses: 300.02 - Generalized Anxiety Disorder  ; V71.09 - No Diagnosis  Psychosocial / Contextual Factors: relational conflict      Referral / Collaboration:  Referral to another professional/service is not indicated at this time..    Anticipated number of session or this episode of care: 26      MeasurableTreatment Goal(s) related to diagnosis / functional impairment(s)   I will know I've met my goal when I learn tools to cope with and lower my anxiety.     Goal 1: Client will experience a reduction in SIOBHAN-7 scores to 5 or below   Objective #A (Client Action)   Client will learn to anticipate and manage anxiety emotions and thoughts. .   Status: Continued: June 27, 2017  Intervention(s)   Therapist will teach acceptance strategies and emotion regulation skills to be more flexible psychologically when the anxiety is present. .   Objective #B   Client will learn to identify  negative thoughts that are present, related to anxiety.   Status: Continued: June 27, 2017  Intervention(s)   Therapist will 1. teach about cognitive distortions and 2. encourage client to daily identify one and write it down.  Objective #C   Client will learn ways to manage the thought distortions that are present.   Status: Continued: June 27, 2017  Intervention(s)   Therapist will 1. teach cognitive restructuring techniques for catastrophic thoughts and 2. diffusion for healthly anxiety thoughts.   Client has reviewed and agreed to the above plan.     Client has reviewed and agreed to the above plan.      Amrit Purcell MA, LMFT June 27, 2017

## 2017-06-28 ASSESSMENT — ANXIETY QUESTIONNAIRES: GAD7 TOTAL SCORE: 5

## 2017-09-05 ENCOUNTER — OFFICE VISIT (OUTPATIENT)
Dept: PSYCHOLOGY | Facility: CLINIC | Age: 22
End: 2017-09-05
Payer: COMMERCIAL

## 2017-09-05 DIAGNOSIS — F41.1 GENERALIZED ANXIETY DISORDER: Primary | ICD-10-CM

## 2017-09-05 PROCEDURE — 90834 PSYTX W PT 45 MINUTES: CPT | Performed by: MARRIAGE & FAMILY THERAPIST

## 2017-09-05 ASSESSMENT — PATIENT HEALTH QUESTIONNAIRE - PHQ9: 5. POOR APPETITE OR OVEREATING: SEVERAL DAYS

## 2017-09-05 ASSESSMENT — ANXIETY QUESTIONNAIRES
7. FEELING AFRAID AS IF SOMETHING AWFUL MIGHT HAPPEN: NOT AT ALL
GAD7 TOTAL SCORE: 5
2. NOT BEING ABLE TO STOP OR CONTROL WORRYING: SEVERAL DAYS
1. FEELING NERVOUS, ANXIOUS, OR ON EDGE: SEVERAL DAYS
IF YOU CHECKED OFF ANY PROBLEMS ON THIS QUESTIONNAIRE, HOW DIFFICULT HAVE THESE PROBLEMS MADE IT FOR YOU TO DO YOUR WORK, TAKE CARE OF THINGS AT HOME, OR GET ALONG WITH OTHER PEOPLE: SOMEWHAT DIFFICULT
3. WORRYING TOO MUCH ABOUT DIFFERENT THINGS: SEVERAL DAYS
5. BEING SO RESTLESS THAT IT IS HARD TO SIT STILL: SEVERAL DAYS
6. BECOMING EASILY ANNOYED OR IRRITABLE: NOT AT ALL

## 2017-09-05 NOTE — MR AVS SNAPSHOT
MRN:[id number]                      After Visit Summary   9/5/2017    Keon Davis    MRN: [id number]           Visit Information        Provider Department      9/5/2017 12:00 PM [facility]      Your next 10 appointments already scheduled     Sep 19, 2017 12:00 PM CDT   Return Visit with Donald Purcell   [facility] ([facility])    [address] [place] [address] [place] [address]   [phone]            Oct 03, 2017 12:00 PM CDT   Return Visit with Donald Purcell   [facility] ([facility])    [address] [place] [address] [place] [address]   [phone]            Oct 17, 2017 12:00 PM CDT   Return Visit with Donald Purcell   [facility] ([facility])    [address] [place] [address] [place] [address]   [phone]            Oct 31, 2017 12:00 PM CDT   Return Visit with Donald Purcell   [facility] ([facility])    [address] [place] [address] [place] [address]   [phone]            Nov 14, 2017 12:00 PM CST   Return Visit with Donald Purcell   [facility] ([facility])    [address] [place] [address] [place] [address]   [phone]            Nov 28, 2017 12:00 PM CST   Return Visit with Donald Purcell   [facility] ([facility])    [address] [place] [address] [place] [address]   [phone]              MyChart Information     [employer] gives you secure access to your electronic health record. If you see a primary care provider, you can also send messages to your care team and make appointments. If you have questions, please call your primary care clinic.  If you do not have a primary care provider, please  call [phone] and they will assist you.        Care EveryWhere ID     This is your Care EveryWhere ID. This could be used by other organizations to access your [place] medical records  [id number]        Equal Access to Services     ALTON SCHWARTZ: Katie Silva, doris salvador, omar kaalmelonie leija, eva schwartz. So [facility] [phone].    ATENCIÓN: Si habla español, tiene a singer disposición servicios gratuitos de asistencia lingüística. Llame al [phone].    We comply with applicable federal civil rights laws and [place] laws. We do not discriminate on the basis of race, color, national origin, age, disability sex, sexual orientation or gender identity.

## 2017-09-05 NOTE — PROGRESS NOTES
Progress Note    Client Name: Keon Davis  Date: September 5, 2017           Service Type: Individual      Session Start Time: 12:00  Session End Time: 12:45      Session Length: 45 min     Session #: 20     Attendees: Client attended alone    Treatment Plan Last Reviewed: June 27, 2017  SIOBHAN-7 : assessed regularly see flow sheets     DATA      Progress Since Last Session (Related to Symptoms / Goals / Homework):   Symptoms: Improved    Homework: Achieved / completed to satisfaction      Episode of Care Goals: Satisfactory progress - MAINTENANCE (Working to maintain change, with risk of relapse); Intervened by continuing to positively reinforce healthy behavior choice      Current / Ongoing Stressors and Concerns:   Stressful job - relationship conflict  - hypervigilance     Treatment Objective(s) Addressed in This Session:   Client will learn to anticipate and manage anxiety emotions and thoughts.     Intervention:  Reviewed skills and self-care that have been helpful in gaining and maintaining improvement.. Client reports he continues to meditate. Processed emotions in session, validated, supportive counseling.     ASSESSMENT: Current Emotional / Mental Status (status of significant symptoms):   Risk status (Self / Other harm or suicidal ideation)   Client denies current fears or concerns for personal safety.   Client denies current or recent suicidal ideation or behaviors.   Client denies current or recent homicidal ideation or behaviors.   Client denies current or recent self injurious behavior or ideation.   Client denies other safety concerns.   A safety and risk management plan has not been developed at this time, however client was given the after-hours number / 911 should there be a change in any of these risk factors.     Appearance:   Appropriate    Eye Contact:   Good    Psychomotor Behavior: Normal    Attitude:   Cooperative     Orientation:   All   Speech    Rate / Production: Normal     Volume:  Normal    Mood:    Anxious    Affect:    Appropriate    Thought Content:  Clear    Thought Form:  Coherent  Logical    Insight:    Good      Medication Review:   No current psychiatric medications prescribed     Medication Compliance:   NA     Changes in Health Issues:   None reported     Chemical Use Review:   Substance Use: Chemical use reviewed, no active concerns identified      Tobacco Use: No current tobacco use.       Collateral Reports Completed:   Not Applicable    PLAN: (Client Tasks / Therapist Tasks / Other)  Client will continue strong self-care performance, including meditation.        Amrit Purcell MA, LMFT                                                       ________________________________________________________________________                                                 Treatment Plan    Client's Name: Keon Davis  YOB: 1995    Date: June 27, 2017      DSM-V Diagnoses: 300.02 - Generalized Anxiety Disorder  ; V71.09 - No Diagnosis  Psychosocial / Contextual Factors: relational conflict      Referral / Collaboration:  Referral to another professional/service is not indicated at this time..    Anticipated number of session or this episode of care: 26      MeasurableTreatment Goal(s) related to diagnosis / functional impairment(s)   I will know I've met my goal when I learn tools to cope with and lower my anxiety.     Goal 1: Client will experience a reduction in SIOBHAN-7 scores to 5 or below   Objective #A (Client Action)   Client will learn to anticipate and manage anxiety emotions and thoughts. .   Status: Continued: June 27, 2017  Intervention(s)   Therapist will teach acceptance strategies and emotion regulation skills to be more flexible psychologically when the anxiety is present. .   Objective #B   Client will learn to identify negative thoughts that are present, related to anxiety.   Status:  Continued: June 27, 2017  Intervention(s)   Therapist will 1. teach about cognitive distortions and 2. encourage client to daily identify one and write it down.  Objective #C   Client will learn ways to manage the thought distortions that are present.   Status: Continued: June 27, 2017  Intervention(s)   Therapist will 1. teach cognitive restructuring techniques for catastrophic thoughts and 2. diffusion for healthly anxiety thoughts.   Client has reviewed and agreed to the above plan.     Client has reviewed and agreed to the above plan.      Amrit Purcell MA, LMFT June 27, 2017

## 2017-09-06 ASSESSMENT — ANXIETY QUESTIONNAIRES: GAD7 TOTAL SCORE: 5

## 2017-11-14 ENCOUNTER — OFFICE VISIT (OUTPATIENT)
Dept: PSYCHOLOGY | Facility: CLINIC | Age: 22
End: 2017-11-14
Payer: COMMERCIAL

## 2017-11-14 DIAGNOSIS — F32.1 MAJOR DEPRESSIVE DISORDER, SINGLE EPISODE, MODERATE (H): Primary | ICD-10-CM

## 2017-11-14 DIAGNOSIS — F41.1 GENERALIZED ANXIETY DISORDER: ICD-10-CM

## 2017-11-14 PROCEDURE — 90834 PSYTX W PT 45 MINUTES: CPT | Performed by: MARRIAGE & FAMILY THERAPIST

## 2017-11-14 ASSESSMENT — ANXIETY QUESTIONNAIRES
2. NOT BEING ABLE TO STOP OR CONTROL WORRYING: SEVERAL DAYS
3. WORRYING TOO MUCH ABOUT DIFFERENT THINGS: NOT AT ALL
GAD7 TOTAL SCORE: 4
5. BEING SO RESTLESS THAT IT IS HARD TO SIT STILL: NOT AT ALL
7. FEELING AFRAID AS IF SOMETHING AWFUL MIGHT HAPPEN: SEVERAL DAYS
1. FEELING NERVOUS, ANXIOUS, OR ON EDGE: SEVERAL DAYS
IF YOU CHECKED OFF ANY PROBLEMS ON THIS QUESTIONNAIRE, HOW DIFFICULT HAVE THESE PROBLEMS MADE IT FOR YOU TO DO YOUR WORK, TAKE CARE OF THINGS AT HOME, OR GET ALONG WITH OTHER PEOPLE: SOMEWHAT DIFFICULT
6. BECOMING EASILY ANNOYED OR IRRITABLE: SEVERAL DAYS

## 2017-11-14 ASSESSMENT — PATIENT HEALTH QUESTIONNAIRE - PHQ9
5. POOR APPETITE OR OVEREATING: NOT AT ALL
SUM OF ALL RESPONSES TO PHQ QUESTIONS 1-9: 14

## 2017-11-14 NOTE — PROGRESS NOTES
Progress Note    Client Name: Keon Davis  Date: November 14, 2017            Service Type: Individual      Session Start Time: 12:00  Session End Time: 12:45      Session Length: 45 min     Session #: 21     Attendees: Client attended alone    Treatment Plan Last Reviewed: November 14, 2017  PHQ-9/SIOBHAN-7 : assessed regularly see flow sheets     DATA      Progress Since Last Session (Related to Symptoms / Goals / Homework):   Symptoms: Worsening    Homework: Achieved / completed to satisfaction      Episode of Care Goals: Satisfactory progress - ACTION (Actively working towards change); Intervened by reinforcing change plan / affirming steps taken     Current / Ongoing Stressors and Concerns:   Stressful job - relationship conflict  - hypervigilance     Treatment Objective(s) Addressed in This Session:   Client will engage in positive self-care.     Intervention:  Reviewed self-care goals. Client reports he feels symptoms of depression (see PHQ-9). We discussed possible causes/treatment. This writer noted client has had some depression symptoms during colder/darker weather in the past. Also, just a couple of weeks after his wedding, normalized a let-down. We agreed that he should continue his meditation, rock climbing, and talk to his doctor about resuming medication that has been helpful in the past. Processed emotions in session, validated, supportive counseling.     ASSESSMENT: Current Emotional / Mental Status (status of significant symptoms):   Risk status (Self / Other harm or suicidal ideation)   Client denies current fears or concerns for personal safety.   Client denies current or recent suicidal ideation or behaviors.   Client denies current or recent homicidal ideation or behaviors.   Client denies current or recent self injurious behavior or ideation.   Client denies other safety concerns.   A safety and risk management plan has not been developed  at this time, however client was given the after-hours number / 911 should there be a change in any of these risk factors.     Appearance:   Appropriate    Eye Contact:   Good    Psychomotor Behavior: Normal    Attitude:   Cooperative    Orientation:   All   Speech    Rate / Production: Normal     Volume:  Normal    Mood:    Depressed    Affect:    Appropriate    Thought Content:  Clear    Thought Form:  Coherent  Logical    Insight:    Good      Medication Review:   No current psychiatric medications prescribed. Suggested he discuss resuming with his PCP.     Medication Compliance:   NA     Changes in Health Issues:   None reported     Chemical Use Review:   Substance Use: Chemical use reviewed, no active concerns identified      Tobacco Use: No current tobacco use.       Collateral Reports Completed:   Not Applicable    PLAN: (Client Tasks / Therapist Tasks / Other)  Client will continue strong self-care performance, including meditation and talk to his doctor about resuming medication that has been helpful in the past.    Amrit Purcell MA, [facility]                                                       ________________________________________________________________________                                                 Treatment Plan    Client's Name: Keon Davis  YOB: 1995    Date: November 14, 2017      DSM-V Diagnoses: 296.22 - Major Depressive Disorder, Single Episode, Moderate    300.02 - Generalized Anxiety Disorder  ; V71.09 - No Diagnosis  Psychosocial / Contextual Factors: relational conflict    Referral / Collaboration:  Referral to another professional/service is not indicated at this time..    Anticipated number of session or this episode of care: 26      MeasurableTreatment Goal(s) related to diagnosis / functional impairment(s)   I will know I've met my goal when I have better tools to control my emotions   Goal 1: Client will achieve a reduction in PHQ-9  scores to 5 or  below.   Objective #A (Client Action)   Client will identify at least 3 cognitive distortions contributing to depression.   Status: New - Date: 11/14/17  Intervention(s)   Therapist will teach about cognitive distortion.   Objective #B   Client will surface and challenge negative self-talk daily. .   Status: New - Date: 11/14/17  Intervention(s)   Therapist will support and monitor.   Objective #C   Client will engage in positive self-care.  Status: New - Date: 11/14/17  Intervention(s)   Therapist will teach self-care goals:  Mindfulness, practice self-compassion, practice authenticity in making choices, maintain balance in schedule (time for self and others, time for relaxation and time for activities, time for leisure and time for tasks, time at home and time out of the house), assert needs and set limits with others as needed, practice intentional physical relaxation, challenge negative thoughts/use affirming and encouraging self-talk, engage with support people on regular basis, practice good self-care (sleep hygiene, balanced eating, regular rest, take care of medical needs, maintain personal hygiene, self-nurturing activities), acknowledge and accept your feelings.  Client has reviewed and agreed to the above plan.      Amrit Purcell MA, LMFT November 14, 2017

## 2017-11-14 NOTE — MR AVS SNAPSHOT
MRN:[id number]                      After Visit Summary   11/14/2017    Keon Davis    MRN: [id number]           Visit Information        Provider Department      11/14/2017 12:00 PM Donald Purcell [place] [facility] Generic      Your next 10 appointments already scheduled     Nov 28, 2017 12:00 PM CST   Return Visit with Donald Purcell   [facility] ([facility])    [address] [place] [address] [address] [place] [address]   [phone]            Dec 12, 2017 12:00 PM CST   Return Visit with Donaldcali Avila Purcell   [facility] ([facility])    [address] [place] [address] [address] [place] [address]   [phone]            Dec 26, 2017 12:00 PM CST   Return Visit with Donald Purcell   [facility] ([facility])    [address] [place] [address] [address] [place] [address]   [phone]              MyChart Information     [facility] gives you secure access to your electronic health record. If you see a primary care provider, you can also send messages to your care team and make appointments. If you have questions, please call your primary care clinic.  If you do not have a primary care provider, please call [phone] and they will assist you.        Care EveryWhere ID     This is your Care EveryWhere ID. This could be used by other organizations to access your [place] medical records  [id number]        Equal Access to Services     ALTON GAN : Hadii aad ku hadasho Sosylvieali, waaxda luqadaha, qaybta kaalmada adeegyada, waxkimo ava schwartz. So [facility] [phone].    ATENCIÓN: Si habla español, tiene a singer disposición servicios gratuitos de asistencia lingüística. Llame al [phone].    We comply with applicable federal civil rights laws and [place] laws. We do not discriminate on the basis of race, color, national  origin, age, disability, sex, sexual orientation, or gender identity.

## 2017-11-15 ASSESSMENT — ANXIETY QUESTIONNAIRES: GAD7 TOTAL SCORE: 4

## 2017-12-12 ENCOUNTER — OFFICE VISIT (OUTPATIENT)
Dept: PSYCHOLOGY | Facility: CLINIC | Age: 22
End: 2017-12-12
Payer: COMMERCIAL

## 2017-12-12 DIAGNOSIS — F41.1 GENERALIZED ANXIETY DISORDER: ICD-10-CM

## 2017-12-12 DIAGNOSIS — F32.1 MAJOR DEPRESSIVE DISORDER, SINGLE EPISODE, MODERATE (H): Primary | ICD-10-CM

## 2017-12-12 PROCEDURE — 90834 PSYTX W PT 45 MINUTES: CPT | Performed by: MARRIAGE & FAMILY THERAPIST

## 2017-12-12 NOTE — MR AVS SNAPSHOT
MRN:[id number]                      After Visit Summary   12/12/2017    Keon Davis    MRN: [id number]           Visit Information        Provider Department      12/12/2017 12:00 PM Donald Purcell [place] [facility] Generic      Your next 10 appointments already scheduled     Dec 26, 2017 12:00 PM CST   Return Visit with Donald Purcell   [facility] ([facility])    [address] [place] [address] [address] [place] [address]   [phone]            Jan 09, 2018 12:00 PM CST   Return Visit with Donald Purcell   [facility] ([facility])    [address] [place] [address] [address] [place] [address]   [phone]            Jan 23, 2018 12:00 PM CST   Return Visit with Donald Purcell   [facility] ([facility])    [address] [place] [address] [address] [place] [address]   [phone]            Feb 06, 2018 12:00 PM CST   Return Visit with Donald Purcell   [facility] ([facility])    [address] [place] [address] [address] [place] [address]   [phone]            Feb 20, 2018 12:00 PM CST   Return Visit with Donald Purcell   [facility] ([facility])    [address] [place] [address] [address] [place] [address]   [phone]              MyChart Information     [facility] gives you secure access to your electronic health record. If you see a primary care provider, you can also send messages to your care team and make appointments. If you have questions, please call your primary care clinic.  If you do not have a primary care provider, please call [phone] and they will assist you.        Care EveryWhere ID     This is your Care EveryWhere ID. This could be used by other organizations to access your [place] medical records  [id number]        Equal  Access to Services     [facility] : Katie Silva, waluc salvador, qaeva taylor. So [facility] [phone].    ATENCIÓN: Si habla español, tiene a singer disposición servicios gratuitos de asistencia lingüística. Llame al [phone].    We comply with applicable federal civil rights laws and [place] laws. We do not discriminate on the basis of race, color, national origin, age, disability, sex, sexual orientation, or gender identity.

## 2017-12-12 NOTE — PROGRESS NOTES
Progress Note    Client Name: Keon Davis  Date: December 12, 2017           Service Type: Individual      Session Start Time: 12:00  Session End Time: 12:45      Session Length: 45 min     Session #: 22     Attendees: Client attended alone    Treatment Plan Last Reviewed: November 14, 2017  PHQ-9/SIOBHAN-7 : assessed regularly see flow sheets     DATA      Progress Since Last Session (Related to Symptoms / Goals / Homework):   Symptoms: Stable    Homework: Achieved / completed to satisfaction      Episode of Care Goals: Satisfactory progress - ACTION (Actively working towards change); Intervened by reinforcing change plan / affirming steps taken     Current / Ongoing Stressors and Concerns:   Stressful job - relationship conflict  - hypervigilance     Treatment Objective(s) Addressed in This Session:   Client will engage in positive self-care.     Intervention:  Reviewed self-care goals. Client reports he continues to feel symptoms of depression. (see PHQ-9). We agreed that he should continue his meditation, rock climbing, and talk to his doctor about resuming medication that has been helpful in the past. Processed emotions in session, validated, supportive counseling.     ASSESSMENT: Current Emotional / Mental Status (status of significant symptoms):   Risk status (Self / Other harm or suicidal ideation)   Client denies current fears or concerns for personal safety.   Client denies current or recent suicidal ideation or behaviors.   Client denies current or recent homicidal ideation or behaviors.   Client denies current or recent self injurious behavior or ideation.   Client denies other safety concerns.   A safety and risk management plan has not been developed at this time, however client was given the after-hours number / 911 should there be a change in any of these risk factors.     Appearance:   Appropriate    Eye Contact:   Good    Psychomotor  Behavior: Normal    Attitude:   Cooperative    Orientation:   All   Speech    Rate / Production: Normal     Volume:  Normal    Mood:    Depressed    Affect:    Appropriate    Thought Content:  Clear    Thought Form:  Coherent  Logical    Insight:    Good      Medication Review:   No current psychiatric medications prescribed. Suggested he discuss resuming with his PCP.     Medication Compliance:   NA     Changes in Health Issues:   None reported     Chemical Use Review:   Substance Use: Chemical use reviewed, no active concerns identified      Tobacco Use: No current tobacco use.       Collateral Reports Completed:   Not Applicable    PLAN: (Client Tasks / Therapist Tasks / Other)  Client will continue strong self-care performance, including meditation and talk to his doctor about resuming medication that has been helpful in the past.    Amrit Purcell MA, LMFT                                                       ________________________________________________________________________                                                 Treatment Plan    Client's Name: Keon Davis  YOB: 1995    Date: November 14, 2017      DSM-V Diagnoses: 296.22 - Major Depressive Disorder, Single Episode, Moderate    300.02 - Generalized Anxiety Disorder  ; V71.09 - No Diagnosis  Psychosocial / Contextual Factors: relational conflict    Referral / Collaboration:  Referral to another professional/service is not indicated at this time..    Anticipated number of session or this episode of care: 26      MeasurableTreatment Goal(s) related to diagnosis / functional impairment(s)   I will know I've met my goal when I have better tools to control my emotions   Goal 1: Client will achieve a reduction in PHQ-9  scores to 5 or below.   Objective #A (Client Action)   Client will identify at least 3 cognitive distortions contributing to depression.   Status: New - Date: 11/14/17  Intervention(s)   Therapist will teach  about cognitive distortion.   Objective #B   Client will surface and challenge negative self-talk daily. .   Status: New - Date: 11/14/17  Intervention(s)   Therapist will support and monitor.   Objective #C   Client will engage in positive self-care.  Status: New - Date: 11/14/17  Intervention(s)   Therapist will teach self-care goals:  Mindfulness, practice self-compassion, practice authenticity in making choices, maintain balance in schedule (time for self and others, time for relaxation and time for activities, time for leisure and time for tasks, time at home and time out of the house), assert needs and set limits with others as needed, practice intentional physical relaxation, challenge negative thoughts/use affirming and encouraging self-talk, engage with support people on regular basis, practice good self-care (sleep hygiene, balanced eating, regular rest, take care of medical needs, maintain personal hygiene, self-nurturing activities), acknowledge and accept your feelings.  Client has reviewed and agreed to the above plan.      Amrit Purcell MA, LMFT November 14, 2017

## 2018-01-09 ENCOUNTER — OFFICE VISIT (OUTPATIENT)
Dept: PSYCHOLOGY | Facility: CLINIC | Age: 23
End: 2018-01-09
Payer: COMMERCIAL

## 2018-01-09 DIAGNOSIS — F32.1 MAJOR DEPRESSIVE DISORDER, SINGLE EPISODE, MODERATE (H): Primary | ICD-10-CM

## 2018-01-09 DIAGNOSIS — F41.1 GENERALIZED ANXIETY DISORDER: ICD-10-CM

## 2018-01-09 PROCEDURE — 90834 PSYTX W PT 45 MINUTES: CPT | Performed by: MARRIAGE & FAMILY THERAPIST

## 2018-01-09 ASSESSMENT — ANXIETY QUESTIONNAIRES
1. FEELING NERVOUS, ANXIOUS, OR ON EDGE: SEVERAL DAYS
IF YOU CHECKED OFF ANY PROBLEMS ON THIS QUESTIONNAIRE, HOW DIFFICULT HAVE THESE PROBLEMS MADE IT FOR YOU TO DO YOUR WORK, TAKE CARE OF THINGS AT HOME, OR GET ALONG WITH OTHER PEOPLE: SOMEWHAT DIFFICULT
5. BEING SO RESTLESS THAT IT IS HARD TO SIT STILL: SEVERAL DAYS
6. BECOMING EASILY ANNOYED OR IRRITABLE: SEVERAL DAYS
GAD7 TOTAL SCORE: 7
7. FEELING AFRAID AS IF SOMETHING AWFUL MIGHT HAPPEN: SEVERAL DAYS
2. NOT BEING ABLE TO STOP OR CONTROL WORRYING: SEVERAL DAYS
3. WORRYING TOO MUCH ABOUT DIFFERENT THINGS: SEVERAL DAYS

## 2018-01-09 ASSESSMENT — PATIENT HEALTH QUESTIONNAIRE - PHQ9
SUM OF ALL RESPONSES TO PHQ QUESTIONS 1-9: 5
5. POOR APPETITE OR OVEREATING: SEVERAL DAYS

## 2018-01-09 NOTE — PROGRESS NOTES
Progress Note    Client Name: Keon Davis  Date: January 9, 2018            Service Type: Individual      Session Start Time: 12:00  Session End Time: 12:45      Session Length: 45 min     Session #: 23     Attendees: Client attended alone    Treatment Plan Last Reviewed: November 14, 2017  PHQ-9/SIOBHAN-7 : assessed regularly see flow sheets     DATA      Progress Since Last Session (Related to Symptoms / Goals / Homework):   Symptoms: Stable    Homework: Achieved / completed to satisfaction      Episode of Care Goals: Satisfactory progress - MAINTENANCE (Working to maintain change, with risk of relapse); Intervened by continuing to positively reinforce healthy behavior choice      Current / Ongoing Stressors and Concerns:   Stressful job - relationship conflict  - hypervigilance     Treatment Objective(s) Addressed in This Session:   Client will engage in positive self-care.     Intervention:  Reviewed self-care goals. Client reports he that despite not taking medication this winter, his anxiety and mood symptoms have been comparatively minimal. He says he is taking vitamin D, meditating, socializing frequently with friends, getting good sleep and successfully nurturing his relationship. Processed emotions in session, validated, supportive counseling.       ASSESSMENT: Current Emotional / Mental Status (status of significant symptoms):   Risk status (Self / Other harm or suicidal ideation)   Client denies current fears or concerns for personal safety.   Client denies current or recent suicidal ideation or behaviors.   Client denies current or recent homicidal ideation or behaviors.   Client denies current or recent self injurious behavior or ideation.   Client denies other safety concerns.   A safety and risk management plan has not been developed at this time, however client was given the after-hours number / 911 should there be a change in any of these risk  factors.     Appearance:   Appropriate    Eye Contact:   Good    Psychomotor Behavior: Normal    Attitude:   Cooperative    Orientation:   All   Speech    Rate / Production: Normal     Volume:  Normal    Mood:    Normal   Affect:    Appropriate    Thought Content:  Clear    Thought Form:  Coherent  Logical    Insight:    Good      Medication Review:   No current psychiatric medications prescribed.      Medication Compliance:   NA     Changes in Health Issues:   None reported     Chemical Use Review:   Substance Use: Chemical use reviewed, no active concerns identified      Tobacco Use: No current tobacco use.       Collateral Reports Completed:   Not Applicable    PLAN: (Client Tasks / Therapist Tasks / Other)  Client will continue strong self-care performance, including meditation.      Amrit Purcell MA, LMFT                                                       ________________________________________________________________________                                                 Treatment Plan    Client's Name: Keon Davis  YOB: 1995    Date: November 14, 2017      DSM-V Diagnoses: 296.22 - Major Depressive Disorder, Single Episode, Moderate    300.02 - Generalized Anxiety Disorder  ; V71.09 - No Diagnosis  Psychosocial / Contextual Factors: relational conflict    Referral / Collaboration:  Referral to another professional/service is not indicated at this time..    Anticipated number of session or this episode of care: 26      MeasurableTreatment Goal(s) related to diagnosis / functional impairment(s)   I will know I've met my goal when I have better tools to control my emotions   Goal 1: Client will achieve a reduction in PHQ-9  scores to 5 or below.   Objective #A (Client Action)   Client will identify at least 3 cognitive distortions contributing to depression.   Status: New - Date: 11/14/17  Intervention(s)   Therapist will teach about cognitive distortion.   Objective #B   Client  will surface and challenge negative self-talk daily. .   Status: New - Date: 11/14/17  Intervention(s)   Therapist will support and monitor.   Objective #C   Client will engage in positive self-care.  Status: New - Date: 11/14/17  Intervention(s)   Therapist will teach self-care goals:  Mindfulness, practice self-compassion, practice authenticity in making choices, maintain balance in schedule (time for self and others, time for relaxation and time for activities, time for leisure and time for tasks, time at home and time out of the house), assert needs and set limits with others as needed, practice intentional physical relaxation, challenge negative thoughts/use affirming and encouraging self-talk, engage with support people on regular basis, practice good self-care (sleep hygiene, balanced eating, regular rest, take care of medical needs, maintain personal hygiene, self-nurturing activities), acknowledge and accept your feelings.  Client has reviewed and agreed to the above plan.      Amrit Purcell MA, LMFT November 14, 2017

## 2018-01-09 NOTE — MR AVS SNAPSHOT
MRN:[id number]                      After Visit Summary   1/9/2018    Keon Davis    MRN: [id number]           Visit Information        Provider Department      1/9/2018 12:00 PM Donald Purcell [facility]      Your next 10 appointments already scheduled     Feb 06, 2018 12:00 PM CST   Return Visit with Donald Purcell   [facility] ([facility]    [address] [place] [address] [address] [place] [phone]            Feb 20, 2018 12:00 PM CST   Return Visit with Donald Purcell   [facility]    [address] [place] [address] [address] [place] [phone]            Mar 06, 2018 12:00 PM CST   Return Visit with Donald Purcell   [facility] ([facility]    [address] [place] [address] [address] [place] [phone]            Mar 20, 2018 12:00 PM CDT   Return Visit with Donald Purcell   [facility]    [address] [place] [address] [address] [place] [phone]              MyChart Information     [employer] gives you secure access to your electronic health record. If you see a primary care provider, you can also send messages to your care team and make appointments. If you have questions, please call your primary care clinic.  If you do not have a primary care provider, please call [phone] and they will assist you.        Care EveryWhere ID     This is your Care EveryWhere ID. This could be used by other organizations to access your [place] medical records  [id number]        Equal Access to Services     ALTON GAN : doris Conner, eva magallanes. So [facility] [phone].    ATENCIÓN: Si brisa davis, edith salazar singer  disposición servicios gratuitos de asistencia lingüística. Llame al [phone].    We comply with applicable federal civil rights laws and [place] laws. We do not discriminate on the basis of race, color, national origin, age, disability, sex, sexual orientation, or gender identity.

## 2018-01-10 ASSESSMENT — ANXIETY QUESTIONNAIRES: GAD7 TOTAL SCORE: 7

## 2018-03-06 ENCOUNTER — OFFICE VISIT (OUTPATIENT)
Dept: PSYCHOLOGY | Facility: CLINIC | Age: 23
End: 2018-03-06
Payer: COMMERCIAL

## 2018-03-06 DIAGNOSIS — F41.1 GAD (GENERALIZED ANXIETY DISORDER): Primary | ICD-10-CM

## 2018-03-06 PROCEDURE — 90834 PSYTX W PT 45 MINUTES: CPT | Performed by: MARRIAGE & FAMILY THERAPIST

## 2018-03-06 ASSESSMENT — ANXIETY QUESTIONNAIRES
3. WORRYING TOO MUCH ABOUT DIFFERENT THINGS: SEVERAL DAYS
6. BECOMING EASILY ANNOYED OR IRRITABLE: SEVERAL DAYS
7. FEELING AFRAID AS IF SOMETHING AWFUL MIGHT HAPPEN: NOT AT ALL
IF YOU CHECKED OFF ANY PROBLEMS ON THIS QUESTIONNAIRE, HOW DIFFICULT HAVE THESE PROBLEMS MADE IT FOR YOU TO DO YOUR WORK, TAKE CARE OF THINGS AT HOME, OR GET ALONG WITH OTHER PEOPLE: SOMEWHAT DIFFICULT
2. NOT BEING ABLE TO STOP OR CONTROL WORRYING: NOT AT ALL
GAD7 TOTAL SCORE: 4
1. FEELING NERVOUS, ANXIOUS, OR ON EDGE: SEVERAL DAYS
5. BEING SO RESTLESS THAT IT IS HARD TO SIT STILL: NOT AT ALL

## 2018-03-06 ASSESSMENT — PATIENT HEALTH QUESTIONNAIRE - PHQ9: 5. POOR APPETITE OR OVEREATING: SEVERAL DAYS

## 2018-03-06 NOTE — MR AVS SNAPSHOT
MRN:[id number]                      After Visit Summary   3/6/2018    Keon Davis    MRN: [id number]           Visit Information        Provider Department      3/6/2018 12:00 PM [facility]      Your next 10 appointments already scheduled     Mar 20, 2018 12:00 PM CDT   Return Visit with Donald Purcell   [facility] ([facility]    [address] [place] [address] [address] [place] [address]   [phone]            Apr 03, 2018 12:00 PM CDT   Return Visit with Donald Purcell   [facility] ([facility]    [address] [place] [address] [address] [place] [address]   [phone]            Apr 17, 2018 12:00 PM CDT   Return Visit with Donald Purcell   [facility] ([facility]    [address] [place] [address] [address] [place] [address]   [phone]            May 01, 2018 12:00 PM CDT   Return Visit with Donald Purcell   [facility] ([facility])    [address] [place] [address] [address] [place] [address]   [phone]            May 15, 2018 12:00 PM CDT   Return Visit with Donald Purcell   [facility] ([facility])    [address] [place] [address] [address] [place] [address]   [phone]            May 29, 2018 12:00 PM CDT   Return Visit with Donald Purcell   [facility] ([facility])    [address] [place] [address] [address] [place] [address]   [phone]              MyChart Information     [employer] gives you secure access to your electronic health record. If you see a primary care provider, you can also send messages to your care team and make appointments. If you have questions, please call your primary care clinic.  If you do not have a primary care provider, please  call [phone] and they will assist you.        Care EveryWhere ID     This is your Care EveryWhere ID. This could be used by other organizations to access your [place] medical records  [id number]        Equal Access to Services     ALTON SCHWARTZ: Katie Silva, doris salvador, omar leija, eva schwartz. So [facility] [phone].    ATENCIÓN: Si habla español, tiene a singer disposición servicios gratuitos de asistencia lingüística. Llame al [phone].    We comply with applicable federal civil rights laws and [place] laws. We do not discriminate on the basis of race, color, national origin, age, disability, sex, sexual orientation, or gender identity.

## 2018-03-06 NOTE — PROGRESS NOTES
Progress Note    Client Name: Keon Davis  Date: March 6, 2018           Service Type: Individual      Session Start Time: 12:00  Session End Time: 12:45      Session Length: 45 min     Session #: 24     Attendees: Client attended alone    Treatment Plan Last Reviewed: March 6, 2018  PHQ-9/SIOBHAN-7 : assessed regularly see flow sheets     DATA      Progress Since Last Session (Related to Symptoms / Goals / Homework):   Symptoms: Stable    Homework: Achieved / completed to satisfaction      Episode of Care Goals: Satisfactory progress - MAINTENANCE (Working to maintain change, with risk of relapse); Intervened by continuing to positively reinforce healthy behavior choice      Current / Ongoing Stressors and Concerns:   Stressful job - relationship conflict  - hypervigilance     Treatment Objective(s) Addressed in This Session:   Client will engage in positive self-care.     Intervention:  Reviewed self-care goals. Client reports he that despite not taking medication this winter, his anxiety and mood symptoms have been comparatively minimal. He says he is taking vitamin D, meditating, socializing frequently with friends, getting good sleep and successfully nurturing his relationship. Processed emotions in session, validated, supportive counseling.       ASSESSMENT: Current Emotional / Mental Status (status of significant symptoms):   Risk status (Self / Other harm or suicidal ideation)   Client denies current fears or concerns for personal safety.   Client denies current or recent suicidal ideation or behaviors.   Client denies current or recent homicidal ideation or behaviors.   Client denies current or recent self injurious behavior or ideation.   Client denies other safety concerns.   A safety and risk management plan has not been developed at this time, however client was given the after-hours number / 911 should there be a change in any of these risk  factors.     Appearance:   Appropriate    Eye Contact:   Good    Psychomotor Behavior: Normal    Attitude:   Cooperative    Orientation:   All   Speech    Rate / Production: Normal     Volume:  Normal    Mood:    Normal   Affect:    Appropriate    Thought Content:  Clear    Thought Form:  Coherent  Logical    Insight:    Good      Medication Review:   No current psychiatric medications prescribed.      Medication Compliance:   NA     Changes in Health Issues:   None reported     Chemical Use Review:   Substance Use: Chemical use reviewed, no active concerns identified      Tobacco Use: No current tobacco use.       Collateral Reports Completed:   Not Applicable    PLAN: (Client Tasks / Therapist Tasks / Other)  Client will continue strong self-care performance, including meditation.      Amrit Purcell MA, LMFT                                                       ________________________________________________________________________                                                 Treatment Plan    Client's Name: Keon Davis  YOB: 1995    Date: March 6, 2018      DSM-V Diagnoses: 296.22 - Major Depressive Disorder, Single Episode, Moderate    300.02 - Generalized Anxiety Disorder  ; V71.09 - No Diagnosis  Psychosocial / Contextual Factors: relational conflict    Referral / Collaboration:  Referral to another professional/service is not indicated at this time..    Anticipated number of session or this episode of care: 26      MeasurableTreatment Goal(s) related to diagnosis / functional impairment(s)   I will know I've met my goal when I have better tools to control my emotions   Goal 1: Client will achieve a reduction in PHQ-9  scores to 5 or below.   Objective #A (Client Action)   Client will identify at least 3 cognitive distortions contributing to depression.   Status: Continued: March 6, 2018  Intervention(s)   Therapist will teach about cognitive distortion.   Objective #B   Client  will surface and challenge negative self-talk daily. .   Status: Continued: March 6, 2018  Intervention(s)   Therapist will support and monitor.   Objective #C   Client will engage in positive self-care.  Status: Continued: March 6, 2018  Intervention(s)   Therapist will teach self-care goals:  Mindfulness, practice self-compassion, practice authenticity in making choices, maintain balance in schedule (time for self and others, time for relaxation and time for activities, time for leisure and time for tasks, time at home and time out of the house), assert needs and set limits with others as needed, practice intentional physical relaxation, challenge negative thoughts/use affirming and encouraging self-talk, engage with support people on regular basis, practice good self-care (sleep hygiene, balanced eating, regular rest, take care of medical needs, maintain personal hygiene, self-nurturing activities), acknowledge and accept your feelings.  Client has reviewed and agreed to the above plan.      Amrit Purcell MA, LMFT March 6, 2018

## 2018-03-07 ASSESSMENT — ANXIETY QUESTIONNAIRES: GAD7 TOTAL SCORE: 4

## 2018-05-15 ENCOUNTER — OFFICE VISIT (OUTPATIENT)
Dept: PSYCHOLOGY | Facility: CLINIC | Age: 23
End: 2018-05-15
Payer: COMMERCIAL

## 2018-05-15 DIAGNOSIS — F32.1 MAJOR DEPRESSIVE DISORDER, SINGLE EPISODE, MODERATE (H): ICD-10-CM

## 2018-05-15 DIAGNOSIS — F41.1 GAD (GENERALIZED ANXIETY DISORDER): Primary | ICD-10-CM

## 2018-05-15 PROCEDURE — 90834 PSYTX W PT 45 MINUTES: CPT | Performed by: MARRIAGE & FAMILY THERAPIST

## 2018-05-15 NOTE — MR AVS SNAPSHOT
MRN:[id number]                      After Visit Summary   5/15/2018    Keno Davis    MRN: [id number]           Visit Information        Provider Department      5/15/2018 12:00 PM [facility]      Your next 10 appointments already scheduled     May 29, 2018 12:00 PM CDT   Return Visit with Donald Purcell   [facility]    [address] [place] [address] [address] [place] [address]   [phone]            Jun 12, 2018 12:00 PM CDT   Return Visit with Donald Purcell   [facility] ([facility])    [address] [place] [address] [address] [place] [address]   [phone]            Jun 26, 2018 12:00 PM CDT   Return Visit with Donald Purcell   [facility]    [address] [place] [address] [address] [place] [address] [phone]            Jul 10, 2018 12:00 PM CDT   Return Visit with Donald Purcell   [facility] ([facility])    [address] [place] [address] [address] [place] [address]   [phone]            Jul 24, 2018 12:00 PM CDT   Return Visit with Donald Purcell   [facility] ([facility]    [address] [place] [address] [address] [place] [address]   [phone]              MyChart Information     [employer] gives you secure access to your electronic health record. If you see a primary care provider, you can also send messages to your care team and make appointments. If you have questions, please call your primary care clinic.  If you do not have a primary care provider, please call [phone] and they will assist you.        Care EveryWhere ID     This is your Care EveryWhere ID. This could be used by other organizations to access your [place] medical records  [id number]        Equal  Access to Services     [facility] : Katie Silva, waluc salvador, qaeva taylor. So [facility] [phone].    ATENCIÓN: Si habla español, tiene a singer disposición servicios gratuitos de asistencia lingüística. Llame al [phone].    We comply with applicable federal civil rights laws and [place] laws. We do not discriminate on the basis of race, color, national origin, age, disability, sex, sexual orientation, or gender identity.             The patient is a 61y year old Male complaining of dizziness.

## 2018-05-15 NOTE — PROGRESS NOTES
Progress Note    Client Name: Keon Davis  Date: May 15, 2018            Service Type: Individual      Session Start Time: 12:00  Session End Time: 12:45      Session Length: 45 min     Session #: 25     Attendees: Client attended alone    Treatment Plan Last Reviewed: March 6, 2018     DATA      Progress Since Last Session (Related to Symptoms / Goals / Homework):   Symptoms: Improved    Homework: Achieved / completed to satisfaction      Episode of Care Goals: Satisfactory progress - MAINTENANCE (Working to maintain change, with risk of relapse); Intervened by continuing to positively reinforce healthy behavior choice      Current / Ongoing Stressors and Concerns:   Stressful job - relationship conflict  - hypervigilance     Treatment Objective(s) Addressed in This Session:   Client will engage in positive self-care.     Intervention:  Reviewed goals helpful to maintaining improvement. Client reports he that despite not taking medication this winter, his anxiety and mood symptoms have been comparatively minimal. He says he is taking vitamin D, meditating, socializing frequently with friends, getting good sleep and successfully nurturing his relationship. Processed emotions in session, validated, supportive counseling.       ASSESSMENT: Current Emotional / Mental Status (status of significant symptoms):   Risk status (Self / Other harm or suicidal ideation)   Client denies current fears or concerns for personal safety.   Client denies current or recent suicidal ideation or behaviors.   Client denies current or recent homicidal ideation or behaviors.   Client denies current or recent self injurious behavior or ideation.   Client denies other safety concerns.   A safety and risk management plan has not been developed at this time, however client was given the after-hours number / 911 should there be a change in any of these risk  factors.     Appearance:   Appropriate    Eye Contact:   Good    Psychomotor Behavior: Normal    Attitude:   Cooperative    Orientation:   All   Speech    Rate / Production: Normal     Volume:  Normal    Mood:    Normal   Affect:    Appropriate    Thought Content:  Clear    Thought Form:  Coherent  Logical    Insight:    Good      Medication Review:   No current psychiatric medications prescribed.      Medication Compliance:   NA     Changes in Health Issues:   None reported     Chemical Use Review:   Substance Use: Chemical use reviewed, no active concerns identified      Tobacco Use: No current tobacco use.       Collateral Reports Completed:   Not Applicable    PLAN: (Client Tasks / Therapist Tasks / Other)  Client will continue strong self-care performance, including twice daily meditation.      Amrit Purcell MA, LMFT                                                       ________________________________________________________________________                                                 Treatment Plan    Client's Name: Keon Davis  YOB: 1995    Date: March 6, 2018      DSM-V Diagnoses: 296.22 - Major Depressive Disorder, Single Episode, Moderate    300.02 - Generalized Anxiety Disorder  ; V71.09 - No Diagnosis  Psychosocial / Contextual Factors: relational conflict    Referral / Collaboration:  Referral to another professional/service is not indicated at this time..    Anticipated number of session or this episode of care: 26      MeasurableTreatment Goal(s) related to diagnosis / functional impairment(s)   I will know I've met my goal when I have better tools to control my emotions   Goal 1: Client will achieve a reduction in PHQ-9  scores to 5 or below.   Objective #A (Client Action)   Client will identify at least 3 cognitive distortions contributing to depression.   Status: Continued: March 6, 2018  Intervention(s)   Therapist will teach about cognitive distortion.   Objective #B    Client will surface and challenge negative self-talk daily. .   Status: Continued: March 6, 2018  Intervention(s)   Therapist will support and monitor.   Objective #C   Client will engage in positive self-care.  Status: Continued: March 6, 2018  Intervention(s)   Therapist will teach self-care goals:  Mindfulness, practice self-compassion, practice authenticity in making choices, maintain balance in schedule (time for self and others, time for relaxation and time for activities, time for leisure and time for tasks, time at home and time out of the house), assert needs and set limits with others as needed, practice intentional physical relaxation, challenge negative thoughts/use affirming and encouraging self-talk, engage with support people on regular basis, practice good self-care (sleep hygiene, balanced eating, regular rest, take care of medical needs, maintain personal hygiene, self-nurturing activities), acknowledge and accept your feelings.  Client has reviewed and agreed to the above plan.      Amrit Purcell MA, LMFT March 6, 2018

## 2019-01-07 ENCOUNTER — OFFICE VISIT (OUTPATIENT)
Dept: PSYCHOLOGY | Facility: CLINIC | Age: 24
End: 2019-01-07
Payer: COMMERCIAL

## 2019-01-07 DIAGNOSIS — F32.1 MAJOR DEPRESSIVE DISORDER, SINGLE EPISODE, MODERATE (H): Primary | ICD-10-CM

## 2019-01-07 DIAGNOSIS — F41.1 GAD (GENERALIZED ANXIETY DISORDER): ICD-10-CM

## 2019-01-07 PROCEDURE — 90834 PSYTX W PT 45 MINUTES: CPT | Performed by: MARRIAGE & FAMILY THERAPIST

## 2019-01-07 ASSESSMENT — ANXIETY QUESTIONNAIRES
IF YOU CHECKED OFF ANY PROBLEMS ON THIS QUESTIONNAIRE, HOW DIFFICULT HAVE THESE PROBLEMS MADE IT FOR YOU TO DO YOUR WORK, TAKE CARE OF THINGS AT HOME, OR GET ALONG WITH OTHER PEOPLE: SOMEWHAT DIFFICULT
5. BEING SO RESTLESS THAT IT IS HARD TO SIT STILL: NOT AT ALL
GAD7 TOTAL SCORE: 5
3. WORRYING TOO MUCH ABOUT DIFFERENT THINGS: MORE THAN HALF THE DAYS
7. FEELING AFRAID AS IF SOMETHING AWFUL MIGHT HAPPEN: NOT AT ALL
2. NOT BEING ABLE TO STOP OR CONTROL WORRYING: NOT AT ALL
6. BECOMING EASILY ANNOYED OR IRRITABLE: SEVERAL DAYS
1. FEELING NERVOUS, ANXIOUS, OR ON EDGE: SEVERAL DAYS

## 2019-01-07 ASSESSMENT — PATIENT HEALTH QUESTIONNAIRE - PHQ9
5. POOR APPETITE OR OVEREATING: SEVERAL DAYS
SUM OF ALL RESPONSES TO PHQ QUESTIONS 1-9: 12

## 2019-01-07 NOTE — PROGRESS NOTES
Progress Note    Client Name: Keon Davis  Date: January 7, 2019             Service Type: Individual      Session Start Time: 11:00  Session End Time: 11:45      Session Length: 45 min     Session #: 26     Attendees: Client attended alone    Treatment Plan Last Reviewed: January 7, 2019  PHQ-9 / SIOBHAN-7: assessed regularly see flow sheets     DATA      Progress Since Last Session (Related to Symptoms / Goals / Homework):   Symptoms: Worsening .    Homework: Achieved / completed to satisfaction      Episode of Care Goals: Satisfactory progress - MAINTENANCE (Working to maintain change, with risk of relapse); Intervened by continuing to positively reinforce healthy behavior choice      Current / Ongoing Stressors and Concerns:   Stressful job - relationship conflict  - hypervigilance     Treatment Objective(s) Addressed in This Session:   Client will engage in positive self-care.     Intervention:  Reviewed behaviors tht ahave been helpful for seasonal low mood in the past. Client reports his depression symptoms have increased with the onset of winter . He says this is an ongoing pattern that has been improved in the past with medication, vitamin D, physical activity and sleep. He says this year he will also begin use of light therapy. He says he is meditating, socializing frequently with friends and successfully nurturing his marriage. Processed emotions in session, validated, supportive counseling.     ASSESSMENT: Current Emotional / Mental Status (status of significant symptoms):   Risk status (Self / Other harm or suicidal ideation)   Client denies current fears or concerns for personal safety.   Client denies current or recent suicidal ideation or behaviors.   Client denies current or recent homicidal ideation or behaviors.   Client denies current or recent self injurious behavior or ideation.   Client denies other safety concerns.   A safety and risk  management plan has not been developed at this time, however client was given the after-hours number / 911 should there be a change in any of these risk factors.     Appearance:   Appropriate    Eye Contact:   Good    Psychomotor Behavior: Normal    Attitude:   Cooperative    Orientation:   All   Speech    Rate / Production: Normal     Volume:  Normal    Mood:    Normal   Affect:    Appropriate    Thought Content:  Clear    Thought Form:  Coherent  Logical    Insight:    Good      Medication Review:   No current psychiatric medications prescribed.      Medication Compliance:   NA     Changes in Health Issues:   None reported     Chemical Use Review:   Substance Use: Chemical use reviewed, no active concerns identified      Tobacco Use: No current tobacco use.       Collateral Reports Completed:   Not Applicable    PLAN: (Client Tasks / Therapist Tasks / Other)  Client will improve self-care performance to improve mood.      Amrit Purcell MA, [facility]                                                       ________________________________________________________________________                                                 Treatment Plan    Client's Name: Keon Davis  YOB: 1995    Date: January 7, 2019      DSM-V Diagnoses: 296.22 - Major Depressive Disorder, Single Episode, Moderate    300.02 - Generalized Anxiety Disorder  ; V71.09 - No Diagnosis  Psychosocial / Contextual Factors: relational conflict    Referral / Collaboration:  Referral to another professional/service is not indicated at this time..    Anticipated number of session or this episode of care: 26      Measurable Treatment Goal(s) related to diagnosis / functional impairment(s)   I will know I've met my goal when I have better tools to control my emotions   Goal 1: Client will achieve a significant reduction in PHQ-9 scores.   Objective #A (Client Action)   Client will identify cognitive distortions contributing to depression.    Status: New - Date: January 7, 2019  Intervention(s)   Therapist will teach about cognitive distortion.   Objective #B   Client will surface and challenge negative self-talk daily. .   Status: New - Date: January 7, 2019  Intervention(s)   Therapist will support and monitor.   Objective #C (Client Action)   Client will learn and integrate CBT/DBT strategies for more effectively managing emotions and relationships.   Status: New - Date: January 7, 2019  Intervention(s)   Therapist will teach emotional regulation skills distress tolerance skills, interpersonal effectiveness skills and mindfulness skills.   Objective #D   Client will engage in positive self-care.  Status: New - Date: January 7, 2019  Intervention(s)   Therapist will teach self-care goals:  Maintain balance in schedule (time for self/others, relaxation/activities, leisure/tasks, home/out of the house), assert needs and set limits with others, challenge negative thoughts/use affirming and encouraging self-talk, engage with support people on regular basis, practice behavior activation behaviors (sleep hygiene, balanced eating, physical activity, medical needs, personal hygiene), acknowledge and accept your feelings.  Client has reviewed and agreed to the above plan.      Amrit Purcell MA, LMFT January 7, 2019

## 2019-01-09 ASSESSMENT — ANXIETY QUESTIONNAIRES: GAD7 TOTAL SCORE: 5

## 2019-02-27 ENCOUNTER — OFFICE VISIT - HEALTHEAST (OUTPATIENT)
Dept: INTERNAL MEDICINE | Facility: CLINIC | Age: 24
End: 2019-02-27

## 2019-02-27 DIAGNOSIS — F41.1 GAD (GENERALIZED ANXIETY DISORDER): ICD-10-CM

## 2019-02-27 DIAGNOSIS — Z00.00 ENCOUNTER FOR GENERAL HEALTH EXAMINATION: ICD-10-CM

## 2019-02-27 LAB
ALBUMIN SERPL-MCNC: 4.5 G/DL (ref 3.5–5)
ALP SERPL-CCNC: 85 U/L (ref 45–120)
ALT SERPL W P-5'-P-CCNC: 20 U/L (ref 0–45)
ANION GAP SERPL CALCULATED.3IONS-SCNC: 8 MMOL/L (ref 5–18)
AST SERPL W P-5'-P-CCNC: 18 U/L (ref 0–40)
BASOPHILS # BLD AUTO: 0 THOU/UL (ref 0–0.2)
BASOPHILS NFR BLD AUTO: 1 % (ref 0–2)
BILIRUB SERPL-MCNC: 0.3 MG/DL (ref 0–1)
BUN SERPL-MCNC: 18 MG/DL (ref 8–22)
CALCIUM SERPL-MCNC: 10.1 MG/DL (ref 8.5–10.5)
CHLORIDE BLD-SCNC: 104 MMOL/L (ref 98–107)
CO2 SERPL-SCNC: 28 MMOL/L (ref 22–31)
CREAT SERPL-MCNC: 0.81 MG/DL (ref 0.7–1.3)
EOSINOPHIL # BLD AUTO: 0.2 THOU/UL (ref 0–0.4)
EOSINOPHIL NFR BLD AUTO: 3 % (ref 0–6)
ERYTHROCYTE [DISTWIDTH] IN BLOOD BY AUTOMATED COUNT: 11.8 % (ref 11–14.5)
GFR SERPL CREATININE-BSD FRML MDRD: >60 ML/MIN/1.73M2
GLUCOSE BLD-MCNC: 96 MG/DL (ref 70–125)
HCT VFR BLD AUTO: 44.7 % (ref 40–54)
HGB BLD-MCNC: 15.1 G/DL (ref 14–18)
LYMPHOCYTES # BLD AUTO: 1.9 THOU/UL (ref 0.8–4.4)
LYMPHOCYTES NFR BLD AUTO: 34 % (ref 20–40)
MCH RBC QN AUTO: 29.4 PG (ref 27–34)
MCHC RBC AUTO-ENTMCNC: 33.8 G/DL (ref 32–36)
MCV RBC AUTO: 87 FL (ref 80–100)
MONOCYTES # BLD AUTO: 0.4 THOU/UL (ref 0–0.9)
MONOCYTES NFR BLD AUTO: 7 % (ref 2–10)
NEUTROPHILS # BLD AUTO: 3.2 THOU/UL (ref 2–7.7)
NEUTROPHILS NFR BLD AUTO: 56 % (ref 50–70)
PLATELET # BLD AUTO: 189 THOU/UL (ref 140–440)
PMV BLD AUTO: 9.2 FL (ref 7–10)
POTASSIUM BLD-SCNC: 5.2 MMOL/L (ref 3.5–5)
PROT SERPL-MCNC: 7.3 G/DL (ref 6–8)
RBC # BLD AUTO: 5.15 MILL/UL (ref 4.4–6.2)
SODIUM SERPL-SCNC: 140 MMOL/L (ref 136–145)
TSH SERPL DL<=0.005 MIU/L-ACNC: 2.09 UIU/ML (ref 0.3–5)
WBC: 5.7 THOU/UL (ref 4–11)

## 2019-02-27 ASSESSMENT — MIFFLIN-ST. JEOR: SCORE: 1654.38

## 2019-02-28 ENCOUNTER — COMMUNICATION - HEALTHEAST (OUTPATIENT)
Dept: INTERNAL MEDICINE | Facility: CLINIC | Age: 24
End: 2019-02-28

## 2019-03-15 ENCOUNTER — OFFICE VISIT (OUTPATIENT)
Dept: PSYCHOLOGY | Facility: CLINIC | Age: 24
End: 2019-03-15
Payer: COMMERCIAL

## 2019-03-15 DIAGNOSIS — F41.1 GAD (GENERALIZED ANXIETY DISORDER): Primary | ICD-10-CM

## 2019-03-15 DIAGNOSIS — F32.1 MAJOR DEPRESSIVE DISORDER, SINGLE EPISODE, MODERATE (H): ICD-10-CM

## 2019-03-15 PROCEDURE — 90834 PSYTX W PT 45 MINUTES: CPT | Performed by: MARRIAGE & FAMILY THERAPIST

## 2019-03-15 NOTE — PROGRESS NOTES
Progress Note    Client Name: Keon Davis  Date: March 15, 2019              Service Type: Individual    Video Visit: No     Session Start Time: 12:30  Session End Time: 1:15      Session Length: 45 min     Session #: 27     Attendees: Client attended alone    Treatment Plan Last Reviewed: January 7, 2019  PHQ-9 / SIOBHAN-7: assessed regularly see flow sheets     DATA  Interactive Complexity: No  Crisis: No             Progress Since Last Session (Related to Symptoms / Goals / Homework):   Symptoms: Improving after beginning medication 8 days ago.    Homework: Achieved / completed to satisfaction      Episode of Care Goals: Satisfactory progress - MAINTENANCE (Working to maintain change, with risk of relapse); Intervened by continuing to positively reinforce healthy behavior choice      Current / Ongoing Stressors and Concerns:   Stressful job - relationship conflict  - hypervigilance     Treatment Objective(s) Addressed in This Session:   Client will engage in positive self-care.     Intervention:  Reviewed behaviors that have been helpful in improving mood. Client reports his depression symptoms have improved as winter has eased and he began medication. He says this has also improved with meditation, vitamin D, physical activity and sleep.  Processed emotions in session, validated, supportive counseling.     ASSESSMENT: Current Emotional / Mental Status (status of significant symptoms):   Risk status (Self / Other harm or suicidal ideation)   Client denies current fears or concerns for personal safety.   Client denies current or recent suicidal ideation or behaviors.   Client denies current or recent homicidal ideation or behaviors.   Client denies current or recent self injurious behavior or ideation.   Client denies other safety concerns.   A safety and risk management plan has not been developed at this time, however client was given the after-hours number /  911 should there be a change in any of these risk factors.     Appearance:   Appropriate    Eye Contact:   Good    Psychomotor Behavior: Normal    Attitude:   Cooperative    Orientation:   All   Speech    Rate / Production: Normal     Volume:  Normal    Mood:    Normal   Affect:    Appropriate    Thought Content:  Clear    Thought Form:  Coherent  Logical    Insight:    Good      Medication Review:   No current psychiatric medications prescribed.      Medication Compliance:   NA     Changes in Health Issues:   None reported     Chemical Use Review:   Substance Use: Chemical use reviewed, no active concerns identified      Tobacco Use: No current tobacco use.       Diagnosis:   Major Depressive Disorder, Single Episode, Moderate     Generalized Anxiety Disorder       Collateral Reports Completed:   Not Applicable    PLAN: (Client Tasks / Therapist Tasks / Other)  Client will continue strong self-care performance to maintain  improved mood.      Amrit Purcell MA, [facility]                                                       ________________________________________________________________________                                                 Treatment Plan    Client's Name: Keon Davis  YOB: 1995    Date: January 7, 2019      DSM-V Diagnoses: 296.22 - Major Depressive Disorder, Single Episode, Moderate    300.02 - Generalized Anxiety Disorder  ; V71.09 - No Diagnosis  Psychosocial / Contextual Factors: relational conflict    Referral / Collaboration:  Referral to another professional/service is not indicated at this time..    Anticipated number of session or this episode of care: 26      Measurable Treatment Goal(s) related to diagnosis / functional impairment(s)   I will know I've met my goal when I have better tools to control my emotions   Goal 1: Client will achieve a significant reduction in PHQ-9 scores.   Objective #A (Client Action)   Client will identify cognitive distortions  contributing to depression.   Status: New - Date: January 7, 2019  Intervention(s)   Therapist will teach about cognitive distortion.   Objective #B   Client will surface and challenge negative self-talk daily. .   Status: New - Date: January 7, 2019  Intervention(s)   Therapist will support and monitor.   Objective #C (Client Action)   Client will learn and integrate CBT/DBT strategies for more effectively managing emotions and relationships.   Status: New - Date: January 7, 2019  Intervention(s)   Therapist will teach emotional regulation skills distress tolerance skills, interpersonal effectiveness skills and mindfulness skills.   Objective #D   Client will engage in positive self-care.  Status: New - Date: January 7, 2019  Intervention(s)   Therapist will teach self-care goals:  Maintain balance in schedule (time for self/others, relaxation/activities, leisure/tasks, home/out of the house), assert needs and set limits with others, challenge negative thoughts/use affirming and encouraging self-talk, engage with support people on regular basis, practice behavior activation behaviors (sleep hygiene, balanced eating, physical activity, medical needs, personal hygiene), acknowledge and accept your feelings.  Client has reviewed and agreed to the above plan.      Amrit Purcell MA, LMFT January 7, 2019

## 2019-04-21 ENCOUNTER — COMMUNICATION - HEALTHEAST (OUTPATIENT)
Dept: INTERNAL MEDICINE | Facility: CLINIC | Age: 24
End: 2019-04-21

## 2019-04-22 ENCOUNTER — COMMUNICATION - HEALTHEAST (OUTPATIENT)
Dept: INTERNAL MEDICINE | Facility: CLINIC | Age: 24
End: 2019-04-22

## 2019-06-09 ENCOUNTER — COMMUNICATION - HEALTHEAST (OUTPATIENT)
Dept: INTERNAL MEDICINE | Facility: CLINIC | Age: 24
End: 2019-06-09

## 2019-06-09 DIAGNOSIS — F41.1 GAD (GENERALIZED ANXIETY DISORDER): ICD-10-CM

## 2019-06-12 ENCOUNTER — OFFICE VISIT (OUTPATIENT)
Dept: PSYCHOLOGY | Facility: CLINIC | Age: 24
End: 2019-06-12
Payer: COMMERCIAL

## 2019-06-12 DIAGNOSIS — F32.1 MAJOR DEPRESSIVE DISORDER, SINGLE EPISODE, MODERATE (H): ICD-10-CM

## 2019-06-12 DIAGNOSIS — F41.1 GAD (GENERALIZED ANXIETY DISORDER): Primary | ICD-10-CM

## 2019-06-12 PROCEDURE — 90834 PSYTX W PT 45 MINUTES: CPT | Performed by: MARRIAGE & FAMILY THERAPIST

## 2019-06-12 ASSESSMENT — ANXIETY QUESTIONNAIRES
3. WORRYING TOO MUCH ABOUT DIFFERENT THINGS: SEVERAL DAYS
6. BECOMING EASILY ANNOYED OR IRRITABLE: SEVERAL DAYS
5. BEING SO RESTLESS THAT IT IS HARD TO SIT STILL: NOT AT ALL
IF YOU CHECKED OFF ANY PROBLEMS ON THIS QUESTIONNAIRE, HOW DIFFICULT HAVE THESE PROBLEMS MADE IT FOR YOU TO DO YOUR WORK, TAKE CARE OF THINGS AT HOME, OR GET ALONG WITH OTHER PEOPLE: SOMEWHAT DIFFICULT
2. NOT BEING ABLE TO STOP OR CONTROL WORRYING: NOT AT ALL
7. FEELING AFRAID AS IF SOMETHING AWFUL MIGHT HAPPEN: NOT AT ALL
GAD7 TOTAL SCORE: 4
1. FEELING NERVOUS, ANXIOUS, OR ON EDGE: SEVERAL DAYS

## 2019-06-12 ASSESSMENT — PATIENT HEALTH QUESTIONNAIRE - PHQ9
SUM OF ALL RESPONSES TO PHQ QUESTIONS 1-9: 7
5. POOR APPETITE OR OVEREATING: SEVERAL DAYS

## 2019-06-12 NOTE — PROGRESS NOTES
Progress Note    Client Name: Keon Davis  Date: June 12, 2019               Service Type: Individual    Video Visit: No     Session Start Time: 10:30  Session End Time: 11:15      Session Length: 45 min     Session #: 28     Attendees: Client attended alone    Treatment Plan Last Reviewed: June 12, 2019  PHQ-9 / SIOBHAN-7: assessed regularly see flow sheets     DATA  Interactive Complexity: No  Crisis: No             Progress Since Last Session (Related to Symptoms / Goals / Homework):   Symptoms: No change .    Homework: Achieved / completed to satisfaction      Episode of Care Goals: Satisfactory progress - MAINTENANCE (Working to maintain change, with risk of relapse); Intervened by continuing to positively reinforce healthy behavior choice      Current / Ongoing Stressors and Concerns:   Stressful job - relationship conflict  - hypervigilance     Treatment Objective(s) Addressed in This Session:   Client will engage in positive self-care.     Intervention:  Reviewed behaviors that have been helpful in improving mood. Client reports his depression symptoms remain improved. He says he is frustrated by the dynamics of his marriage in which communication and negotiation skills are lacking and result in stubborn conflict. Taught and role-played negotiation skills. Processed emotions in session, validated, supportive counseling.     ASSESSMENT: Current Emotional / Mental Status (status of significant symptoms):   Risk status (Self / Other harm or suicidal ideation)   Client denies current fears or concerns for personal safety.   Client denies current or recent suicidal ideation or behaviors.   Client denies current or recent homicidal ideation or behaviors.   Client denies current or recent self injurious behavior or ideation.   Client denies other safety concerns.   A safety and risk management has not been developed at this time.  Client was given the Suicide  Prevention National Hotline, Text [place] 013751, the [facility] Crisis Number, or encouraged to Call 911 should there be a change in these risk factors.       Appearance:   Appropriate    Eye Contact:   Good    Psychomotor Behavior: Normal    Attitude:   Cooperative    Orientation:   All   Speech    Rate / Production: Normal     Volume:  Normal    Mood:    Normal   Affect:    Appropriate    Thought Content:  Clear    Thought Form:  Coherent  Logical    Insight:    Good      Medication Review:   No current psychiatric medications prescribed.      Medication Compliance:   NA     Changes in Health Issues:   None reported     Chemical Use Review:   Substance Use: Chemical use reviewed, no active concerns identified      Tobacco Use: No current tobacco use.       Diagnosis:   Major Depressive Disorder, Single Episode, Moderate     Generalized Anxiety Disorder       Collateral Reports Completed:   Not Applicable    PLAN: (Client Tasks / Therapist Tasks / Other)  Client will continue strong self-care performance to maintain  improved mood.      Amrit Purcell MA, [facility]                                                       ________________________________________________________________________                                                 Treatment Plan    Client's Name: Keon Davis  YOB: 1995    Date: June 12, 2019      DSM-V Diagnoses: 296.22 - Major Depressive Disorder, Single Episode, Moderate    300.02 - Generalized Anxiety Disorder  ; V71.09 - No Diagnosis  Psychosocial / Contextual Factors: relational conflict    Referral / Collaboration:  Referral to another professional/service is not indicated at this time..    Anticipated number of session or this episode of care: 26      Measurable Treatment Goal(s) related to diagnosis / functional impairment(s)   I will know I've met my goal when I have better tools to control my emotions   Goal 1: Client will achieve a significant reduction in PHQ-9 scores.    Objective #A (Client Action)   Client will identify cognitive distortions contributing to depression.   Status:  Continued: June 12, 2019  Intervention(s)   Therapist will teach about cognitive distortion.   Objective #B   Client will surface and challenge negative self-talk daily. .   Status: Continued: June 12, 2019  Intervention(s)   Therapist will support and monitor.   Objective #C (Client Action)   Client will learn and integrate CBT/DBT strategies for more effectively managing emotions and relationships.   Status: Continued: June 12, 2019  Intervention(s)   Therapist will teach emotional regulation skills distress tolerance skills, interpersonal effectiveness skills and mindfulness skills.   Objective #D   Client will engage in positive self-care.  Status: Continued: June 12, 2019  Intervention(s)   Therapist will teach self-care goals:  Maintain balance in schedule (time for self/others, relaxation/activities, leisure/tasks, home/out of the house), assert needs and set limits with others, challenge negative thoughts/use affirming and encouraging self-talk, engage with support people on regular basis, practice behavior activation behaviors (sleep hygiene, balanced eating, physical activity, medical needs, personal hygiene), acknowledge and accept your feelings.  Client has reviewed and agreed to the above plan.      Amrit Purcell MA, LMFT June 12, 2019

## 2019-06-13 ASSESSMENT — ANXIETY QUESTIONNAIRES: GAD7 TOTAL SCORE: 4

## 2019-08-21 ENCOUNTER — OFFICE VISIT - HEALTHEAST (OUTPATIENT)
Dept: INTERNAL MEDICINE | Facility: CLINIC | Age: 24
End: 2019-08-21

## 2019-08-21 DIAGNOSIS — F41.1 GAD (GENERALIZED ANXIETY DISORDER): ICD-10-CM

## 2019-08-21 ASSESSMENT — MIFFLIN-ST. JEOR: SCORE: 1685.85

## 2019-09-25 ENCOUNTER — OFFICE VISIT - HEALTHEAST (OUTPATIENT)
Dept: INTERNAL MEDICINE | Facility: CLINIC | Age: 24
End: 2019-09-25

## 2019-09-25 DIAGNOSIS — Z23 FLU VACCINE NEED: ICD-10-CM

## 2019-09-25 DIAGNOSIS — B07.8 OTHER VIRAL WARTS: ICD-10-CM

## 2019-09-25 DIAGNOSIS — Z48.02 VISIT FOR SUTURE REMOVAL: ICD-10-CM

## 2019-09-25 ASSESSMENT — MIFFLIN-ST. JEOR: SCORE: 1693.22

## 2019-09-28 ENCOUNTER — HEALTH MAINTENANCE LETTER (OUTPATIENT)
Age: 24
End: 2019-09-28

## 2020-05-01 ENCOUNTER — VIRTUAL VISIT (OUTPATIENT)
Dept: PSYCHOLOGY | Facility: CLINIC | Age: 25
End: 2020-05-01
Payer: COMMERCIAL

## 2020-05-01 DIAGNOSIS — F32.1 MAJOR DEPRESSIVE DISORDER, SINGLE EPISODE, MODERATE (H): ICD-10-CM

## 2020-05-01 DIAGNOSIS — F41.1 GAD (GENERALIZED ANXIETY DISORDER): Primary | ICD-10-CM

## 2020-05-01 PROCEDURE — 90834 PSYTX W PT 45 MINUTES: CPT | Mod: 95 | Performed by: MARRIAGE & FAMILY THERAPIST

## 2020-05-01 ASSESSMENT — ANXIETY QUESTIONNAIRES
7. FEELING AFRAID AS IF SOMETHING AWFUL MIGHT HAPPEN: MORE THAN HALF THE DAYS
GAD7 TOTAL SCORE: 9
2. NOT BEING ABLE TO STOP OR CONTROL WORRYING: MORE THAN HALF THE DAYS
IF YOU CHECKED OFF ANY PROBLEMS ON THIS QUESTIONNAIRE, HOW DIFFICULT HAVE THESE PROBLEMS MADE IT FOR YOU TO DO YOUR WORK, TAKE CARE OF THINGS AT HOME, OR GET ALONG WITH OTHER PEOPLE: SOMEWHAT DIFFICULT
1. FEELING NERVOUS, ANXIOUS, OR ON EDGE: MORE THAN HALF THE DAYS
3. WORRYING TOO MUCH ABOUT DIFFERENT THINGS: SEVERAL DAYS
6. BECOMING EASILY ANNOYED OR IRRITABLE: SEVERAL DAYS
5. BEING SO RESTLESS THAT IT IS HARD TO SIT STILL: SEVERAL DAYS

## 2020-05-01 ASSESSMENT — PATIENT HEALTH QUESTIONNAIRE - PHQ9
SUM OF ALL RESPONSES TO PHQ QUESTIONS 1-9: 7
5. POOR APPETITE OR OVEREATING: NOT AT ALL

## 2020-05-01 NOTE — PROGRESS NOTES
Progress Note    Client Name: Keon Davis  Date: May 1, 2020                Service Type: Individual    Video Visit: Yes    Telemedicine Visit: The patient's condition can be safely assessed and treated via synchronous audio and visual telemedicine encounter.    Reason for Telemedicine Visit: Services only offered telehealth and due to COVID-19 restrictions  Originating Site (Patient Location): Patient's home  Distant Site (Provider Location): Provider Remote Setting  Consent:  The patient/guardian has verbally consented to: the potential risks and benefits of telemedicine (video visit) versus in person care; bill my insurance or make self-payment for services provided; and responsibility for payment of non-covered services.   Mode of Communication:  Video Conference via Doxy  As the provider I attest to compliance with applicable laws and regulations related to telemedicine.     Session Start Time: 8:30  Session End Time: 9:15      Session Length: 45 min     Session #: 29     Attendees: Client attended alone    Treatment Plan Last Reviewed: May 1, 2020  PHQ-9 / SIOBHAN-7: assessed regularly see flow sheets     DATA  Interactive Complexity: No  Crisis: No             Progress Since Last Session (Related to Symptoms / Goals / Homework):   Symptoms: Worsening with pandemic anxiety and lack of work.    Homework: Achieved / completed to satisfaction      Episode of Care Goals: Satisfactory progress - MAINTENANCE (Working to maintain change, with risk of relapse); Intervened by continuing to positively reinforce healthy behavior choice      Current / Ongoing Stressors and Concerns:   Stressful job - relationship conflict  - hypervigilance - pandemic     Treatment Objective(s) Addressed in This Session:   Client will engage in positive self-care.     Intervention:  Reviewed behaviors that have been helpful in improving mood. Checked for safety. Provided COVID-19  education and planning. Client reports anxiety about the pandemic. Normalized and recommended strategies for typical  Sheltering in Place  symptoms such as weight gain, anxiety, trouble with sleep, strange dreams and cognitive fogginess. Processed emotions in session, validated, supportive counseling.     ASSESSMENT: Current Emotional / Mental Status (status of significant symptoms):   Risk status (Self / Other harm or suicidal ideation)   Client denies current fears or concerns for personal safety.   Client denies current or recent suicidal ideation or behaviors.   Client denies current or recent homicidal ideation or behaviors.   Client denies current or recent self injurious behavior or ideation.   Client denies other safety concerns.   A safety and risk management has not been developed at this time.  Client was given the Suicide Prevention National Hotline, Text [place] 593410, the [facility] Crisis Number, or encouraged to Call 911 should there be a change in these risk factors.       Appearance:   Appropriate    Eye Contact:   Good    Psychomotor Behavior: Normal    Attitude:   Cooperative    Orientation:   All   Speech    Rate / Production: Normal     Volume:  Normal    Mood:    Anxious    Affect:    Appropriate    Thought Content:  Clear    Thought Form:  Coherent  Logical    Insight:    Good      Medication Review:   No current psychiatric medications prescribed.      Medication Compliance:   NA     Changes in Health Issues:   None reported     Chemical Use Review:   Substance Use: Chemical use reviewed, no active concerns identified      Tobacco Use: No current tobacco use.       Diagnosis:   Major Depressive Disorder, Single Episode, Moderate     Generalized Anxiety Disorder       Collateral Reports Completed:   Not Applicable    PLAN: (Client Tasks / Therapist Tasks / Other)  Client will stay educated about COVID-19 conditions and precautions. Client will reach out to at least one person per day for support and  use self-care strategies to mitigate the behavioral results of the pandemic.      Amrit Purcell MA, LMFT                                                       ________________________________________________________________________                                                 Treatment Plan    Client's Name: Keon Davis  YOB: 1995    Date: May 1, 2020      DSM-V Diagnoses: 296.22 - Major Depressive Disorder, Single Episode, Moderate    300.02 - Generalized Anxiety Disorder  ; V71.09 - No Diagnosis  Psychosocial / Contextual Factors: relational conflict    Referral / Collaboration:  Referral to another professional/service is not indicated at this time..    Anticipated number of session or this episode of care: 26      Measurable Treatment Goal(s) related to diagnosis / functional impairment(s)   I will know I've met my goal when I have better tools to control my emotions   Goal 1: Client will achieve a significant reduction in PHQ-9 scores.   Objective #A (Client Action)   Client will identify cognitive distortions contributing to depression.   Status:  Continued: May 1, 2020  Intervention(s)   Therapist will teach about cognitive distortion.   Objective #B   Client will surface and challenge negative self-talk daily. .   Status: Continued: May 1, 2020  Intervention(s)   Therapist will support and monitor.   Objective #C (Client Action)   Client will learn and integrate CBT/DBT strategies for more effectively managing emotions and relationships.   Status: Continued: May 1, 2020  Intervention(s)   Therapist will teach emotional regulation skills distress tolerance skills, interpersonal effectiveness skills and mindfulness skills.   Objective #D   Client will engage in positive self-care.  Status: Continued: May 1, 2020  Intervention(s)   Therapist will teach self-care goals:  Maintain balance in schedule (time for self/others, relaxation/activities, leisure/tasks, home/out of the house),  assert needs and set limits with others, challenge negative thoughts/use affirming and encouraging self-talk, engage with support people on regular basis, practice behavior activation behaviors (sleep hygiene, balanced eating, physical activity, medical needs, personal hygiene), acknowledge and accept your feelings.  Client has reviewed and agreed to the above plan.      Amrit Purcell MA, LMFT May 1, 2020

## 2020-05-02 ASSESSMENT — ANXIETY QUESTIONNAIRES: GAD7 TOTAL SCORE: 9

## 2020-06-17 ENCOUNTER — VIRTUAL VISIT (OUTPATIENT)
Dept: PSYCHOLOGY | Facility: CLINIC | Age: 25
End: 2020-06-17
Payer: COMMERCIAL

## 2020-06-17 DIAGNOSIS — F41.1 GAD (GENERALIZED ANXIETY DISORDER): Primary | ICD-10-CM

## 2020-06-17 DIAGNOSIS — F32.1 MAJOR DEPRESSIVE DISORDER, SINGLE EPISODE, MODERATE (H): ICD-10-CM

## 2020-06-17 PROCEDURE — 90834 PSYTX W PT 45 MINUTES: CPT | Mod: 95 | Performed by: MARRIAGE & FAMILY THERAPIST

## 2020-06-17 NOTE — PROGRESS NOTES
Progress Note    Client Name: Keon Davis  Date: June 17, 2020                 Service Type: Individual    Video Visit: Yes    Telemedicine Visit: The patient's condition can be safely assessed and treated via synchronous audio and visual telemedicine encounter.    Reason for Telemedicine Visit: Services only offered telehealth and due to COVID-19 restrictions  Originating Site (Patient Location): Patient's home  Distant Site (Provider Location): Provider Remote Setting  Consent:  The patient/guardian has verbally consented to: the potential risks and benefits of telemedicine (video visit) versus in person care; bill my insurance or make self-payment for services provided; and responsibility for payment of non-covered services.   Mode of Communication:  Video Conference via D[employer]  As the provider I attest to compliance with applicable laws and regulations related to telemedicine.     Session Start Time: 11:30  Session End Time: 12:15      Session Length: 45 min     Session #: 30     Attendees: Client attended alone    Treatment Plan Last Reviewed: May 1, 2020  PHQ-9 / SIOBHAN-7: assessed regularly see flow sheets     DATA  Interactive Complexity: No  Crisis: No             Progress Since Last Session (Related to Symptoms / Goals / Homework):   Symptoms: Improving .    Homework: Achieved / completed to satisfaction      Episode of Care Goals: Satisfactory progress - MAINTENANCE (Working to maintain change, with risk of relapse); Intervened by continuing to positively reinforce healthy behavior choice      Current / Ongoing Stressors and Concerns:   Stressful job - relationship conflict  - hypervigilance - pandemic     Treatment Objective(s) Addressed in This Session:   Client will engage in positive self-care.     Intervention:  Reviewed behaviors that have been helpful in improving mood. Client reports that his return to work has gone well. He says he is getting  good physical exercise, but needs to re-integrate meditation daily. Processed emotions in session, validated, supportive counseling.     ASSESSMENT: Current Emotional / Mental Status (status of significant symptoms):   Risk status (Self / Other harm or suicidal ideation)   Client denies current fears or concerns for personal safety.   Client denies current or recent suicidal ideation or behaviors.   Client denies current or recent homicidal ideation or behaviors.   Client denies current or recent self injurious behavior or ideation.   Client denies other safety concerns.   A safety and risk management has not been developed at this time.  Client was given the Suicide Prevention National Hotline, Text [place] 683447, the [facility] Crisis Number, or encouraged to Call 911 should there be a change in these risk factors.       Appearance:   Appropriate    Eye Contact:   Good    Psychomotor Behavior: Normal    Attitude:   Cooperative    Orientation:   All   Speech    Rate / Production: Normal     Volume:  Normal    Mood:    Anxious    Affect:    Appropriate    Thought Content:  Clear    Thought Form:  Coherent  Logical    Insight:    Good      Medication Review:   No current psychiatric medications prescribed.      Medication Compliance:   NA     Changes in Health Issues:   None reported     Chemical Use Review:   Substance Use: Chemical use reviewed, no active concerns identified      Tobacco Use: No current tobacco use.       Diagnosis:   Major Depressive Disorder, Single Episode, Moderate     Generalized Anxiety Disorder       Collateral Reports Completed:   Not Applicable    PLAN: (Client Tasks / Therapist Tasks / Other)  Client will continue strong self-care performance and restart daily meditation.      Amrit Purcell MA, LMFT                                                       ________________________________________________________________________                                                 Treatment Plan    Client's  Name: Keon Davis  YOB: 1995    Date: May 1, 2020      DSM-V Diagnoses: 296.22 - Major Depressive Disorder, Single Episode, Moderate    300.02 - Generalized Anxiety Disorder  ; V71.09 - No Diagnosis  Psychosocial / Contextual Factors: relational conflict    Referral / Collaboration:  Referral to another professional/service is not indicated at this time..    Anticipated number of session or this episode of care: 26      Measurable Treatment Goal(s) related to diagnosis / functional impairment(s)   I will know I've met my goal when I have better tools to control my emotions   Goal 1: Client will achieve a significant reduction in PHQ-9 scores.   Objective #A (Client Action)   Client will identify cognitive distortions contributing to depression.   Status:  Continued: May 1, 2020  Intervention(s)   Therapist will teach about cognitive distortion.   Objective #B   Client will surface and challenge negative self-talk daily. .   Status: Continued: May 1, 2020  Intervention(s)   Therapist will support and monitor.   Objective #C (Client Action)   Client will learn and integrate CBT/DBT strategies for more effectively managing emotions and relationships.   Status: Continued: May 1, 2020  Intervention(s)   Therapist will teach emotional regulation skills distress tolerance skills, interpersonal effectiveness skills and mindfulness skills.   Objective #D   Client will engage in positive self-care.  Status: Continued: May 1, 2020  Intervention(s)   Therapist will teach self-care goals:  Maintain balance in schedule (time for self/others, relaxation/activities, leisure/tasks, home/out of the house), assert needs and set limits with others, challenge negative thoughts/use affirming and encouraging self-talk, engage with support people on regular basis, practice behavior activation behaviors (sleep hygiene, balanced eating, physical activity, medical needs, personal hygiene), acknowledge and accept  your feelings.  Client has reviewed and agreed to the above plan.      Amrit Purcell MA, LMFT May 1, 2020

## 2021-01-10 ENCOUNTER — HEALTH MAINTENANCE LETTER (OUTPATIENT)
Age: 26
End: 2021-01-10

## 2021-02-15 ENCOUNTER — VIRTUAL VISIT (OUTPATIENT)
Dept: PSYCHOLOGY | Facility: CLINIC | Age: 26
End: 2021-02-15
Payer: COMMERCIAL

## 2021-02-15 DIAGNOSIS — F32.1 MAJOR DEPRESSIVE DISORDER, SINGLE EPISODE, MODERATE (H): Primary | ICD-10-CM

## 2021-02-15 DIAGNOSIS — F41.1 GAD (GENERALIZED ANXIETY DISORDER): ICD-10-CM

## 2021-02-15 PROCEDURE — 90834 PSYTX W PT 45 MINUTES: CPT | Mod: GT | Performed by: MARRIAGE & FAMILY THERAPIST

## 2021-02-16 ASSESSMENT — ANXIETY QUESTIONNAIRES
3. WORRYING TOO MUCH ABOUT DIFFERENT THINGS: SEVERAL DAYS
1. FEELING NERVOUS, ANXIOUS, OR ON EDGE: SEVERAL DAYS
6. BECOMING EASILY ANNOYED OR IRRITABLE: SEVERAL DAYS
7. FEELING AFRAID AS IF SOMETHING AWFUL MIGHT HAPPEN: SEVERAL DAYS
5. BEING SO RESTLESS THAT IT IS HARD TO SIT STILL: SEVERAL DAYS
IF YOU CHECKED OFF ANY PROBLEMS ON THIS QUESTIONNAIRE, HOW DIFFICULT HAVE THESE PROBLEMS MADE IT FOR YOU TO DO YOUR WORK, TAKE CARE OF THINGS AT HOME, OR GET ALONG WITH OTHER PEOPLE: SOMEWHAT DIFFICULT
2. NOT BEING ABLE TO STOP OR CONTROL WORRYING: SEVERAL DAYS
GAD7 TOTAL SCORE: 7

## 2021-02-16 ASSESSMENT — PATIENT HEALTH QUESTIONNAIRE - PHQ9
5. POOR APPETITE OR OVEREATING: SEVERAL DAYS
SUM OF ALL RESPONSES TO PHQ QUESTIONS 1-9: 7

## 2021-02-16 NOTE — PROGRESS NOTES
Progress Note    Client Name: Keon Davis  Date: February 15, 2021                 Service Type: Individual    Video Visit: Yes    Telemedicine Visit: The patient's condition can be safely assessed and treated via synchronous audio and visual telemedicine encounter.    Reason for Telemedicine Visit: Services only offered telehealth and due to COVID-19 restrictions  Originating Site (Patient Location): Patient's home  Distant Site (Provider Location): Provider Remote Setting  Consent:  The patient/guardian has verbally consented to: the potential risks and benefits of telemedicine (video visit) versus in person care; bill my insurance or make self-payment for services provided; and responsibility for payment of non-covered services.   Mode of Communication:  Video Conference via [email or web address]  As the provider I attest to compliance with applicable laws and regulations related to telemedicine.     Session Start Time: 10:00  Session End Time: 10:45      Session Length: 45 min     Session #: 31     Attendees: Client attended alone    Treatment Plan Last Reviewed: February 15, 2021  PHQ-9 / SIOBHAN-7: assessed regularly see flow sheets     DATA  Interactive Complexity: No  Crisis: No             Progress Since Last Session (Related to Symptoms / Goals / Homework):   Symptoms: Improving .    Homework: Achieved / completed to satisfaction      Episode of Care Goals: Satisfactory progress - MAINTENANCE (Working to maintain change, with risk of relapse); Intervened by continuing to positively reinforce healthy behavior choice      Current / Ongoing Stressors and Concerns:   Stressful job - relationship conflict  - hypervigilance - pandemic     Treatment Objective(s) Addressed in This Session:   Client will engage in positive self-care.     Intervention:  Reviewed behaviors that have been helpful in improving mood. Client reports that his return to work has gone well. He says he  is getting good physical exercise, but needs to re-integrate meditation daily. He says his wife is restless and irritable due to multiple stressors. Discussed communication strategies to be more emotionally intimate.  Processed emotions in session, validated, supportive counseling.     ASSESSMENT: Current Emotional / Mental Status (status of significant symptoms):   Risk status (Self / Other harm or suicidal ideation)   Client denies current fears or concerns for personal safety.   Client denies current or recent suicidal ideation or behaviors.   Client denies current or recent homicidal ideation or behaviors.   Client denies current or recent self injurious behavior or ideation.   Client denies other safety concerns.   A safety and risk management has not been developed at this time.  Client was given the Suicide Prevention National Hotline, Text [place] 345166, the [facility] Crisis Number, or encouraged to Call 911 should there be a change in these risk factors.       Appearance:   Appropriate    Eye Contact:   Good    Psychomotor Behavior: Normal    Attitude:   Cooperative    Orientation:   All   Speech    Rate / Production: Normal     Volume:  Normal    Mood:    Anxious    Affect:    Appropriate    Thought Content:  Clear    Thought Form:  Coherent  Logical    Insight:    Good      Medication Review:   No current psychiatric medications prescribed.      Medication Compliance:   NA     Changes in Health Issues:   None reported     Chemical Use Review:   Substance Use: Chemical use reviewed, no active concerns identified      Tobacco Use: No current tobacco use.       Diagnosis:   Major Depressive Disorder, Single Episode, Moderate     Generalized Anxiety Disorder       Collateral Reports Completed:   Not Applicable    PLAN: (Client Tasks / Therapist Tasks / Other)  Client will continue strong self-care performance and restart daily meditation.      Amrit Purcell MA, LMFT                                                        ________________________________________________________________________                                                 Treatment Plan    Client's Name: Keon Davis  YOB: 1995    Date: February 15, 2021      DSM-V Diagnoses: 296.22 - Major Depressive Disorder, Single Episode, Moderate    300.02 - Generalized Anxiety Disorder  ; V71.09 - No Diagnosis  Psychosocial / Contextual Factors: relational conflict    Referral / Collaboration:  Referral to another professional/service is not indicated at this time..    Anticipated number of session or this episode of care: 26    Measurable Treatment Goal(s) related to diagnosis / functional impairment(s)   I will know I've met my goal when I have better tools to control my emotions   Goal 1: Client will achieve a significant reduction in PHQ-9 scores.   Objective #A (Client Action)   Client will identify cognitive distortions and negative self-talk contributing to depression.   Status: New - Date: February 15, 2021  Intervention(s)   Therapist will teach about identification and strategies to counter negative self-talk and cognitive distortions.  Objective #B (Client Action)   Client will learn and integrate CBT/DBT strategies for more effectively managing emotions and relationships.   Status: New - Date: February 15, 2021  Intervention(s)   Therapist will teach emotional regulation skills distress tolerance skills, interpersonal effectiveness skills and mindfulness skills.   Objective #C   Client will engage in positive self-care.  Status: New - Date: February 15, 2021  Intervention(s)   Therapist will teach self-care goals:  Maintain balance in schedule (time for self/others, relaxation/activities, leisure/tasks, home/out of the house), assert needs and set limits with others, challenge negative thoughts/use affirming and encouraging self-talk, engage with support people on regular basis, practice behavior activation behaviors (sleep hygiene,  balanced eating, physical activity, medical needs, personal hygiene), acknowledge and accept your feelings.    Client has reviewed and agreed to the above plan.      Amrit Purcell MA, LMFT February 15, 2021

## 2021-02-17 ASSESSMENT — ANXIETY QUESTIONNAIRES: GAD7 TOTAL SCORE: 7

## 2021-05-29 NOTE — TELEPHONE ENCOUNTER
Refill Approved    Rx renewed per Medication Renewal Policy. Medication was last renewed on 2/27/19.    Darlene Walker, [facility] Triage/Med Refill 6/10/2019     Requested Prescriptions   Pending Prescriptions Disp Refills     escitalopram oxalate (LEXAPRO) 20 MG tablet 30 tablet 2     Sig: Take 1 tablet (20 mg total) by mouth daily.       SSRI Refill Protocol  Passed - 6/9/2019 12:52 PM        Passed - PCP or prescribing provider visit in last year     Last office visit with prescriber/PCP: Visit date not found OR same dept: Visit date not found OR same specialty: Visit date not found  Last physical: 2/27/2019 Last MTM visit: Visit date not found   Next visit within 3 mo: Visit date not found  Next physical within 3 mo: Visit date not found  Prescriber OR PCP: Shane Ghosh MD  Last diagnosis associated with med order: There are no diagnoses linked to this encounter.  If protocol passes may refill for 12 months if within 3 months of last provider visit (or a total of 15 months).

## 2021-05-31 NOTE — PROGRESS NOTES
" ASSESSMENT AND PLAN:    1. SIOBHAN (generalized anxiety disorder)  He is improved and would like to stop escitalopram.  We discussed a two week taper.  Follow up annually and prn if symptoms recur.     Patient Instructions   1.  taper off escitalopram as directed.     2. Follow up one year and as needed.     CHIEF COMPLAINT:  Chief Complaint   Patient presents with     Follow-up     Lexapro     HISTORY OF PRESENT ILLNESS:  Keon Davis is a 24 y.o. male is here in follow up.  He has tolerated the escitalopram well with improvement in social phobia and self consciousness and anxiety.  He works out and does meditation and is feeling better.  He would like to stop the medication.  Has otherwise been well.     REVIEW OF SYSTEMS:   See HPI, all other systems on review are negative.    Past Medical History:   Diagnosis Date     Anxiety disorder      Social History     Tobacco Use   Smoking Status Never Smoker   Smokeless Tobacco Never Used     Family History   Problem Relation Age of Onset     Leukemia Paternal Grandmother      Cancer Paternal Grandfather      Past Surgical History:   Procedure Laterality Date     WISDOM TOOTH EXTRACTION       VITALS:  Vitals:    08/21/19 1000   BP: 116/70   Patient Site: Left Arm   Patient Position: Sitting   Cuff Size: Adult Regular   Pulse: 68   SpO2: 98%   Weight: 156 lb 7 oz (71 kg)   Height: 5' 10\" (1.778 m)     Wt Readings from Last 3 Encounters:   08/21/19 156 lb 7 oz (71 kg)   02/27/19 149 lb 8 oz (67.8 kg)     PHYSICAL EXAM:  Constitutional:  In NAD, alert and oriented  Psychiatric:  Mood and behavior appropriate, thinking is clear.     Current Outpatient Medications   Medication Sig Dispense Refill     escitalopram oxalate (LEXAPRO) 20 MG tablet Take 1 tablet (20 mg total) by mouth daily. 90 tablet 2     No current facility-administered medications for this visit.      Shane Ghosh MD  Internal Medicine  [facility]  "

## 2021-06-01 NOTE — PROGRESS NOTES
" ASSESSMENT AND PLAN:    1. Viral warts  left index finger at the dorsal PIP joint was treated with liquid nitrogen.  Will also treat topically.    - salicylic acid-lactic acid 17 % external solution; Apply to wart nightly for 1-2 weeks.  Dispense: 9 mL; Refill: 5    2. Flu vaccine need  Given.     3. Visit for suture removal  4 sutures removed.     Patient Instructions   1. duofilm daily for 1-2 weeks to wart    2. Sutures removed.     3. Flu shot today    4. Follow up prn.     CHIEF COMPLAINT:  Chief Complaint   Patient presents with     Hospital Visit Follow Up     ED, [facility] [place], [place], discharged 9/17/19, will need suture removal on finger     Warts     left hand 2nd finger     HISTORY OF PRESENT ILLNESS:  Keon Davis is a 24 y.o. male with left index finger laceration 8 days ago.  Has sutures.  Also a wart over the index finger joint.      REVIEW OF SYSTEMS:   See HPI, all other systems on review are negative.    Past Medical History:   Diagnosis Date     Anxiety disorder      Social History     Tobacco Use   Smoking Status Never Smoker   Smokeless Tobacco Never Used     Family History   Problem Relation Age of Onset     Leukemia Paternal Grandmother      Cancer Paternal Grandfather      Past Surgical History:   Procedure Laterality Date     WISDOM TOOTH EXTRACTION       VITALS:  Vitals:    09/25/19 1418   BP: 126/82   Patient Site: Left Arm   Patient Position: Sitting   Cuff Size: Adult Regular   Pulse: 70   SpO2: 98%   Weight: 158 lb 1 oz (71.7 kg)   Height: 5' 10\" (1.778 m)     Wt Readings from Last 3 Encounters:   09/25/19 158 lb 1 oz (71.7 kg)   08/21/19 156 lb 7 oz (71 kg)   02/27/19 149 lb 8 oz (67.8 kg)     PHYSICAL EXAM:  Extremities: 4 stitches, removed.  Wart over the dorsal PIP, treated with liquid nitrogen.     Current Outpatient Medications   Medication Sig Dispense Refill     salicylic acid-lactic acid 17 % external solution Apply to wart nightly for 1-2 weeks. 9 mL 5     Shane Ghosh MD  Internal " Medicine  [facility]

## 2021-06-01 NOTE — PATIENT INSTRUCTIONS - HE
1. duofilm daily for 1-2 weeks to wart    2. Sutures removed.     3. Flu shot today    4. Follow up prn.

## 2021-06-02 VITALS — BODY MASS INDEX: 21.4 KG/M2 | WEIGHT: 149.5 LBS | HEIGHT: 70 IN

## 2021-06-03 VITALS
SYSTOLIC BLOOD PRESSURE: 126 MMHG | HEIGHT: 70 IN | WEIGHT: 158.06 LBS | OXYGEN SATURATION: 98 % | HEART RATE: 70 BPM | BODY MASS INDEX: 22.63 KG/M2 | DIASTOLIC BLOOD PRESSURE: 82 MMHG

## 2021-06-03 VITALS — HEIGHT: 70 IN | WEIGHT: 156.44 LBS | BODY MASS INDEX: 22.4 KG/M2

## 2021-06-18 NOTE — LETTER
Letter by Shane Ghosh MD at      Author: Shane Ghosh MD Service: -- Author Type: --    Filed:  Encounter Date: 2/28/2019 Status: (Other)       Keon Davis  [address] [place] [address]     February 28, 2019     Dear Mr. Davis,    Below are the results from your recent visit:    Resulted Orders   Comprehensive Metabolic Panel   Result Value Ref Range    Sodium 140 136 - 145 mmol/L    Potassium 5.2 (H) 3.5 - 5.0 mmol/L    Chloride 104 98 - 107 mmol/L    CO2 28 22 - 31 mmol/L    Anion Gap, Calculation 8 5 - 18 mmol/L    Glucose 96 70 - 125 mg/dL    BUN 18 8 - 22 mg/dL    Creatinine 0.81 0.70 - 1.30 mg/dL    GFR MDRD Af Amer >60 >60 mL/min/1.73m2    GFR MDRD Non Af Amer >60 >60 mL/min/1.73m2    Bilirubin, Total 0.3 0.0 - 1.0 mg/dL    Calcium 10.1 8.5 - 10.5 mg/dL    Protein, Total 7.3 6.0 - 8.0 g/dL    Albumin 4.5 3.5 - 5.0 g/dL    Alkaline Phosphatase 85 45 - 120 U/L    AST 18 0 - 40 U/L    ALT 20 0 - 45 U/L    Narrative    Fasting Glucose reference range is 70-99 mg/dL per  American Diabetes Association (ADA) guidelines.   Thyroid Stimulating Hormone (TSH)   Result Value Ref Range    TSH 2.09 0.30 - 5.00 uIU/mL   HM1 (CBC with Diff)   Result Value Ref Range    WBC 5.7 4.0 - 11.0 thou/uL    RBC 5.15 4.40 - 6.20 mill/uL    Hemoglobin 15.1 14.0 - 18.0 g/dL    Hematocrit 44.7 40.0 - 54.0 %    MCV 87 80 - 100 fL    MCH 29.4 27.0 - 34.0 pg    MCHC 33.8 32.0 - 36.0 g/dL    RDW 11.8 11.0 - 14.5 %    Platelets 189 140 - 440 thou/uL    MPV 9.2 7.0 - 10.0 fL    Neutrophils % 56 50 - 70 %    Lymphocytes % 34 20 - 40 %    Monocytes % 7 2 - 10 %    Eosinophils % 3 0 - 6 %    Basophils % 1 0 - 2 %    Neutrophils Absolute 3.2 2.0 - 7.7 thou/uL    Lymphocytes Absolute 1.9 0.8 - 4.4 thou/uL    Monocytes Absolute 0.4 0.0 - 0.9 thou/uL    Eosinophils Absolute 0.2 0.0 - 0.4 thou/uL    Basophils Absolute 0.0 0.0 - 0.2 thou/uL       Your tests are good.  The minor abnormalities are not significant.     Please call with  questions or contact us using [employer].    Sincerely,        Electronically signed by Shane Ghosh MD

## 2021-06-19 NOTE — LETTER
Letter by Shane Ghosh MD at      Author: Shane Ghosh MD Service: -- Author Type: --    Filed:  Encounter Date: 4/22/2019 Status: (Other)           April 22, 2019     Patient: Keon Davis   YOB: 1995   Date of Visit: 4/22/2019       To Whom It May Concern:    It is my medical opinion that Keon Davis needs to bring his medications with him for his upcoming trip to [facility] for 9 days.     If you have any questions or concerns, please don't hesitate to call.    Sincerely,        Electronically signed by Shane Ghosh MD

## 2021-06-24 NOTE — PROGRESS NOTES
ASSESSMENT/PLAN:    1. SIOBHAN (generalized anxiety disorder)  With social phobia.  He is interested in treatment.  He has counseling.  He had benefit on citalopram in the past.  Will start with escitalopram 20mg po daily, taking 10 mg po daily the first week.  Follow up in 4-6 weeks.    - Thyroid Stimulating Hormone (TSH)    2. Encounter for general health examination  His history and exam are otherwise without acute issues.  We discussed healthy lifestyle and diet and exercise. He declined tetanus today.     - HM1(CBC and Differential)  - Comprehensive Metabolic Panel    CHIEF COMPLAINT:  Chief Complaint   Patient presents with     Annual Exam     Anxiety     seeing therapist periodically     HISTORY OF PRESENT ILLNESS:  Keon is a 23 y.o. male presenting to the clinic today with request for physical examination.  He has been well.  He does not smoke, or use drugs or excess alcohol.  Employed and .  Active and tolerates exercise.  He has a history of social phobia and anxiety and at one time he improved with citalopram.  He denies symptoms of depression or any idea of self harm.      REVIEW OF SYSTEMS:   Constitutional: no fever, chills, or sweats  Respiratory: No wheezes, cough, shortness of breath  Cardiovascular: No chest pain or palpitations  Gastrointestinal: No nausea, vomiting, diarrhea, dyspepsia, or pain  Psychiatric: see HPI  All other systems on reveiw are negative.    PFSH:    Social History     Tobacco Use   Smoking Status Never Smoker   Smokeless Tobacco Never Used     Family History   Problem Relation Age of Onset     Leukemia Paternal Grandmother      Cancer Paternal Grandfather      Social History     Socioeconomic History     Marital status:      Spouse name: Not on file     Number of children: Not on file     Years of education: Not on file     Highest education level: Not on file   Occupational History     Not on file   Social Needs     Financial resource strain: Not on file     Food  "insecurity:     Worry: Not on file     Inability: Not on file     Transportation needs:     Medical: Not on file     Non-medical: Not on file   Tobacco Use     Smoking status: Never Smoker     Smokeless tobacco: Never Used   Substance and Sexual Activity     Alcohol use: Yes     Comment: \"occasionally\"     Drug use: No     Sexual activity: Yes     Partners: Female     Birth control/protection: None   Lifestyle     Physical activity:     Days per week: Not on file     Minutes per session: Not on file     Stress: Not on file   Relationships     Social connections:     Talks on phone: Not on file     Gets together: Not on file     Attends Oriental orthodox service: Not on file     Active member of club or organization: Not on file     Attends meetings of clubs or organizations: Not on file     Relationship status: Not on file     Intimate partner violence:     Fear of current or ex partner: Not on file     Emotionally abused: Not on file     Physically abused: Not on file     Forced sexual activity: Not on file   Other Topics Concern     Not on file   Social History Narrative    , , no children, has a dog.  Rock climber, art - pen and ink.       Past Surgical History:   Procedure Laterality Date     WISDOM TOOTH EXTRACTION       No Known Allergies    Past Medical History:   Diagnosis Date     Anxiety disorder      VITALS:  Vitals:    02/27/19 0941   BP: 120/80   Patient Site: Left Arm   Patient Position: Sitting   Cuff Size: Adult Regular   Pulse: (!) 55   SpO2: 99%   Weight: 149 lb 8 oz (67.8 kg)   Height: 5' 10\" (1.778 m)     Wt Readings from Last 3 Encounters:   02/27/19 149 lb 8 oz (67.8 kg)     Body mass index is 21.45 kg/m .    PHYSICAL EXAM:  General Appearance: In no acute distress  /80 (Patient Site: Left Arm, Patient Position: Sitting, Cuff Size: Adult Regular)   Pulse (!) 55   Ht 5' 10\" (1.778 m)   Wt 149 lb 8 oz (67.8 kg)   SpO2 99%   BMI 21.45 kg/m    EYES: Clear, without " inflammation, fundi normal   HEENT: Without congestion or inflammation  NECK:  supple, without adenopathy or thryroid enlargement  RESPIRATORY: Clear to auscultation  CARDIOVASCULAR: S1, S2, without murmur   ABDOMEN: soft, flat, and non-tender, without mass, rebound, or guarding  RECTAL: deferred  GENITOURINARY: normal testes and phallus  ETREMITIES: No joint swelling, or inflammation, distal pulses diminished, no ulcer or edema  NEUROLOGIC: Non-focal, no arm or leg  weakness, speech is clear, gait is normal  PSYCHIATRIC: Oriented X 3, without confusion, behavior and affect normal, thinking is clear    Current Outpatient Medications   Medication Sig Dispense Refill     escitalopram oxalate (LEXAPRO) 20 MG tablet Take 1 tablet (20 mg total) by mouth daily. 30 tablet 2     No current facility-administered medications for this visit.

## 2021-06-28 ENCOUNTER — VIRTUAL VISIT (OUTPATIENT)
Dept: PSYCHOLOGY | Facility: CLINIC | Age: 26
End: 2021-06-28
Payer: COMMERCIAL

## 2021-06-28 DIAGNOSIS — F32.1 MAJOR DEPRESSIVE DISORDER, SINGLE EPISODE, MODERATE (H): Primary | ICD-10-CM

## 2021-06-28 DIAGNOSIS — F41.1 GAD (GENERALIZED ANXIETY DISORDER): ICD-10-CM

## 2021-06-28 PROCEDURE — 90834 PSYTX W PT 45 MINUTES: CPT | Mod: GT | Performed by: MARRIAGE & FAMILY THERAPIST

## 2021-06-28 ASSESSMENT — ANXIETY QUESTIONNAIRES
5. BEING SO RESTLESS THAT IT IS HARD TO SIT STILL: SEVERAL DAYS
GAD7 TOTAL SCORE: 10
6. BECOMING EASILY ANNOYED OR IRRITABLE: MORE THAN HALF THE DAYS
2. NOT BEING ABLE TO STOP OR CONTROL WORRYING: SEVERAL DAYS
7. FEELING AFRAID AS IF SOMETHING AWFUL MIGHT HAPPEN: SEVERAL DAYS
3. WORRYING TOO MUCH ABOUT DIFFERENT THINGS: SEVERAL DAYS
1. FEELING NERVOUS, ANXIOUS, OR ON EDGE: MORE THAN HALF THE DAYS
IF YOU CHECKED OFF ANY PROBLEMS ON THIS QUESTIONNAIRE, HOW DIFFICULT HAVE THESE PROBLEMS MADE IT FOR YOU TO DO YOUR WORK, TAKE CARE OF THINGS AT HOME, OR GET ALONG WITH OTHER PEOPLE: SOMEWHAT DIFFICULT

## 2021-06-28 ASSESSMENT — PATIENT HEALTH QUESTIONNAIRE - PHQ9
5. POOR APPETITE OR OVEREATING: MORE THAN HALF THE DAYS
SUM OF ALL RESPONSES TO PHQ QUESTIONS 1-9: 9

## 2021-06-28 NOTE — PROGRESS NOTES
Progress Note    Client Name: Keon Davis  Date: June 28, 2021                  Service Type: Individual    Video Visit: Yes    Telemedicine Visit: The patient's condition can be safely assessed and treated via synchronous audio and visual telemedicine encounter.    Reason for Telemedicine Visit: Services only offered telehealth and due to COVID-19 restrictions  Originating Site (Patient Location): Patient's home  Distant Site (Provider Location): Provider Remote Setting  Consent:  The patient/guardian has verbally consented to: the potential risks and benefits of telemedicine (video visit) versus in person care; bill my insurance or make self-payment for services provided; and responsibility for payment of non-covered services.   Mode of Communication:  Video Conference via [email or web address]  As the provider I attest to compliance with applicable laws and regulations related to telemedicine.     Session Start Time: 12:00  Session End Time: 12:45      Session Length: 45 min     Session #: 32     Attendees: Client attended alone    Treatment Plan Last Reviewed: June 28, 2021      DATA  Interactive Complexity: No  Crisis: No             Progress Since Last Session (Related to Symptoms / Goals / Homework):   Symptoms: Worsening due to marital conflict    Homework: Achieved / completed to satisfaction      Episode of Care Goals: Satisfactory progress - MAINTENANCE (Working to maintain change, with risk of relapse); Intervened by continuing to positively reinforce healthy behavior choice      Current / Ongoing Stressors and Concerns:   Stressful job - relationship conflict  - hypervigilance - pandemic     Treatment Objective(s) Addressed in This Session:   Client will engage in positive self-care.     Intervention:  Reviewed behaviors that have been helpful in improving mood. Client reports  his wife is restless and irritable due to multiple stressors. Discussed  communication strategies to be more emotionally intimate.  Processed emotions in session, validated, supportive counseling.     ASSESSMENT: Current Emotional / Mental Status (status of significant symptoms):   Risk status (Self / Other harm or suicidal ideation)   Client denies current fears or concerns for personal safety.   Client denies current or recent suicidal ideation or behaviors.   Client denies current or recent homicidal ideation or behaviors.   Client denies current or recent self injurious behavior or ideation.   Client denies other safety concerns.   A safety and risk management has not been developed at this time.  Client was given the Suicide Prevention National Hotline, Text [place] 832234, the [facility] Crisis Number, or encouraged to Call 911 should there be a change in these risk factors.       Appearance:   Appropriate    Eye Contact:   Good    Psychomotor Behavior: Normal    Attitude:   Cooperative    Orientation:   All   Speech    Rate / Production: Normal     Volume:  Normal    Mood:    Anxious    Affect:    Appropriate    Thought Content:  Clear    Thought Form:  Coherent  Logical    Insight:    Good      Medication Review:   No current psychiatric medications prescribed.      Medication Compliance:   NA     Changes in Health Issues:   None reported     Chemical Use Review:   Substance Use: Chemical use reviewed, no active concerns identified      Tobacco Use: No current tobacco use.       Diagnosis:   Major Depressive Disorder, Single Episode, Moderate     Generalized Anxiety Disorder       Collateral Reports Completed:   Not Applicable    PLAN: (Client Tasks / Therapist Tasks / Other)  Client will continue strong self-care performance and increase daily meditation.      Amrit Purcell MA, LMFT                                                       ________________________________________________________________________                                                 Treatment Plan    Client's Name:  Keon Davis  YOB: 1995    Date: June 28, 2021       DSM-V Diagnoses: 296.22 - Major Depressive Disorder, Single Episode, Moderate    300.02 - Generalized Anxiety Disorder  ; V71.09 - No Diagnosis  Psychosocial / Contextual Factors: relational conflict    Referral / Collaboration:  Referral to another professional/service is not indicated at this time..    Anticipated number of session or this episode of care: 26    Measurable Treatment Goal(s) related to diagnosis / functional impairment(s)   I will know I've met my goal when I have better tools to control my emotions   Goal 1: Client will achieve a significant reduction in PHQ-9 scores.   Objective #A (Client Action)   Client will identify cognitive distortions and negative self-talk contributing to depression.   Status: Continued: June 28, 2021   Intervention(s)   Therapist will teach about identification and strategies to counter negative self-talk and cognitive distortions.  Objective #B (Client Action)   Client will learn and integrate CBT/DBT strategies for more effectively managing emotions and relationships.   Status: Continued: June 28, 2021  Intervention(s)   Therapist will teach emotional regulation skills distress tolerance skills, interpersonal effectiveness skills and mindfulness skills.   Objective #C   Client will engage in positive self-care.  Status: Continued: June 28, 2021  Intervention(s)   Therapist will teach self-care goals:  Maintain balance in schedule (time for self/others, relaxation/activities, leisure/tasks, home/out of the house), assert needs and set limits with others, challenge negative thoughts/use affirming and encouraging self-talk, engage with support people on regular basis, practice behavior activation behaviors (sleep hygiene, balanced eating, physical activity, medical needs, personal hygiene), acknowledge and accept your feelings.    Client has reviewed and agreed to the above plan.      Amrit  KATHRYN Purcell, LMFT June 28, 2021

## 2021-06-29 ASSESSMENT — ANXIETY QUESTIONNAIRES: GAD7 TOTAL SCORE: 10

## 2021-07-26 ENCOUNTER — VIRTUAL VISIT (OUTPATIENT)
Dept: PSYCHOLOGY | Facility: CLINIC | Age: 26
End: 2021-07-26
Payer: COMMERCIAL

## 2021-07-26 DIAGNOSIS — F41.1 GAD (GENERALIZED ANXIETY DISORDER): ICD-10-CM

## 2021-07-26 DIAGNOSIS — F32.1 MAJOR DEPRESSIVE DISORDER, SINGLE EPISODE, MODERATE (H): Primary | ICD-10-CM

## 2021-07-26 PROCEDURE — 90834 PSYTX W PT 45 MINUTES: CPT | Mod: GT | Performed by: MARRIAGE & FAMILY THERAPIST

## 2021-07-27 NOTE — PROGRESS NOTES
Progress Note    Client Name: Keon Davis  Date: July 26, 2021                  Service Type: Individual    Video Visit: Yes    Telemedicine Visit: The patient's condition can be safely assessed and treated via synchronous audio and visual telemedicine encounter.    Reason for Telemedicine Visit: Services only offered telehealth and due to COVID-19 restrictions  Originating Site (Patient Location): Patient's home  Distant Site (Provider Location): Provider Remote Setting  Consent:  The patient/guardian has verbally consented to: the potential risks and benefits of telemedicine (video visit) versus in person care; bill my insurance or make self-payment for services provided; and responsibility for payment of non-covered services.   Mode of Communication:  Video Conference via [email or web address]  As the provider I attest to compliance with applicable laws and regulations related to telemedicine.     Session Start Time: 1:30  Session End Time: 2:15      Session Length: 45 min     Session #: 33     Attendees: Client attended alone    Treatment Plan Last Reviewed: June 28, 2021      DATA  Interactive Complexity: No  Crisis: No             Progress Since Last Session (Related to Symptoms / Goals / Homework):   Symptoms: Improving .    Homework: Achieved / completed to satisfaction      Episode of Care Goals: Satisfactory progress - MAINTENANCE (Working to maintain change, with risk of relapse); Intervened by continuing to positively reinforce healthy behavior choice      Current / Ongoing Stressors and Concerns:   Stressful job - relationship conflict  - hypervigilance - pandemic     Treatment Objective(s) Addressed in This Session:   Client will engage in positive self-care.     Intervention:  Reviewed behaviors that have been helpful in improving mood. Client reports  He and his wife are communicating more effectively. He says his mood is buoyed by behavioral activation  behaviors and strong self-care performance. Processed emotions in session, validated, supportive counseling.    ASSESSMENT: Current Emotional / Mental Status (status of significant symptoms):   Risk status (Self / Other harm or suicidal ideation)   Client denies current fears or concerns for personal safety.   Client denies current or recent suicidal ideation or behaviors.   Client denies current or recent homicidal ideation or behaviors.   Client denies current or recent self injurious behavior or ideation.   Client denies other safety concerns.   A safety and risk management has not been developed at this time.  Client was given the Suicide Prevention National Hotline, Text [place] 185963, the [facility] Crisis Number, or encouraged to Call 911 should there be a change in these risk factors.       Appearance:   Appropriate    Eye Contact:   Good    Psychomotor Behavior: Normal    Attitude:   Interested   Orientation:   All   Speech    Rate / Production: Normal     Volume:  Normal    Mood:    Euthymic   Affect:    Appropriate    Thought Content:  Clear    Thought Form:  Coherent  Logical    Insight:    Good      Medication Review:   No current psychiatric medications prescribed.      Medication Compliance:   NA     Changes in Health Issues:   None reported     Chemical Use Review:   Substance Use: Chemical use reviewed, no active concerns identified      Tobacco Use: No current tobacco use.       Diagnosis:   Major Depressive Disorder, Single Episode, Moderate     Generalized Anxiety Disorder       Collateral Reports Completed:   Not Applicable    PLAN: (Client Tasks / Therapist Tasks / Other)  Client will continue strong self-care performance and continue daily meditation.      Amrit Purcell MA, LMFT                                                       ________________________________________________________________________                                                 Treatment Plan    Client's Name: Keon Sutherland  Ryan  YOB: 1995    Date: June 28, 2021       DSM-V Diagnoses: 296.22 - Major Depressive Disorder, Single Episode, Moderate    300.02 - Generalized Anxiety Disorder  ; V71.09 - No Diagnosis  Psychosocial / Contextual Factors: relational conflict    Referral / Collaboration:  Referral to another professional/service is not indicated at this time..    Anticipated number of session or this episode of care: 26    Measurable Treatment Goal(s) related to diagnosis / functional impairment(s)   I will know I've met my goal when I have better tools to control my emotions   Goal 1: Client will achieve a significant reduction in PHQ-9 scores.   Objective #A (Client Action)   Client will identify cognitive distortions and negative self-talk contributing to depression.   Status: Continued: June 28, 2021   Intervention(s)   Therapist will teach about identification and strategies to counter negative self-talk and cognitive distortions.  Objective #B (Client Action)   Client will learn and integrate CBT/DBT strategies for more effectively managing emotions and relationships.   Status: Continued: June 28, 2021  Intervention(s)   Therapist will teach emotional regulation skills distress tolerance skills, interpersonal effectiveness skills and mindfulness skills.   Objective #C   Client will engage in positive self-care.  Status: Continued: June 28, 2021  Intervention(s)   Therapist will teach self-care goals:  Maintain balance in schedule (time for self/others, relaxation/activities, leisure/tasks, home/out of the house), assert needs and set limits with others, challenge negative thoughts/use affirming and encouraging self-talk, engage with support people on regular basis, practice behavior activation behaviors (sleep hygiene, balanced eating, physical activity, medical needs, personal hygiene), acknowledge and accept your feelings.    Client has reviewed and agreed to the above plan.      Amrit Purcell MA, LMFT June  28, 2021

## 2021-10-04 ENCOUNTER — VIRTUAL VISIT (OUTPATIENT)
Dept: PSYCHOLOGY | Facility: CLINIC | Age: 26
End: 2021-10-04
Payer: COMMERCIAL

## 2021-10-04 DIAGNOSIS — F41.1 GAD (GENERALIZED ANXIETY DISORDER): ICD-10-CM

## 2021-10-04 DIAGNOSIS — F32.1 MAJOR DEPRESSIVE DISORDER, SINGLE EPISODE, MODERATE (H): Primary | ICD-10-CM

## 2021-10-04 PROCEDURE — 90834 PSYTX W PT 45 MINUTES: CPT | Mod: GT | Performed by: MARRIAGE & FAMILY THERAPIST

## 2021-10-05 ASSESSMENT — ANXIETY QUESTIONNAIRES
GAD7 TOTAL SCORE: 9
IF YOU CHECKED OFF ANY PROBLEMS ON THIS QUESTIONNAIRE, HOW DIFFICULT HAVE THESE PROBLEMS MADE IT FOR YOU TO DO YOUR WORK, TAKE CARE OF THINGS AT HOME, OR GET ALONG WITH OTHER PEOPLE: SOMEWHAT DIFFICULT
3. WORRYING TOO MUCH ABOUT DIFFERENT THINGS: MORE THAN HALF THE DAYS
7. FEELING AFRAID AS IF SOMETHING AWFUL MIGHT HAPPEN: SEVERAL DAYS
6. BECOMING EASILY ANNOYED OR IRRITABLE: SEVERAL DAYS
1. FEELING NERVOUS, ANXIOUS, OR ON EDGE: MORE THAN HALF THE DAYS
2. NOT BEING ABLE TO STOP OR CONTROL WORRYING: SEVERAL DAYS
5. BEING SO RESTLESS THAT IT IS HARD TO SIT STILL: SEVERAL DAYS

## 2021-10-05 ASSESSMENT — PATIENT HEALTH QUESTIONNAIRE - PHQ9
SUM OF ALL RESPONSES TO PHQ QUESTIONS 1-9: 6
5. POOR APPETITE OR OVEREATING: SEVERAL DAYS

## 2021-10-05 NOTE — PROGRESS NOTES
Progress Note    Client Name: Keon Davis  Date: October 4, 2021                   Service Type: Individual    Video Visit: Yes    Telemedicine Visit: The patient's condition can be safely assessed and treated via synchronous audio and visual telemedicine encounter.    Reason for Telemedicine Visit: Services only offered telehealth and due to COVID-19 restrictions  Originating Site (Patient Location): Patient's home  Distant Site (Provider Location): Provider Remote Setting  Consent:  The patient/guardian has verbally consented to: the potential risks and benefits of telemedicine (video visit) versus in person care; bill my insurance or make self-payment for services provided; and responsibility for payment of non-covered services.   Mode of Communication:  Video Conference via [email or web address]  As the provider I attest to compliance with applicable laws and regulations related to telemedicine.     Session Start Time: 2:30  Session End Time: 3:15      Session Length: 45 min     Session #: 34     Attendees: Client attended alone    Treatment Plan Last Reviewed: October 4, 2021     DATA  Interactive Complexity: No  Crisis: No             Progress Since Last Session (Related to Symptoms / Goals / Homework):   Symptoms: Worsening with change in season.    Homework: Achieved / completed to satisfaction      Episode of Care Goals: Satisfactory progress - MAINTENANCE (Working to maintain change, with risk of relapse); Intervened by continuing to positively reinforce healthy behavior choice      Current / Ongoing Stressors and Concerns:   Stressful job - relationship conflict  - hypervigilance - pandemic     Treatment Objective(s) Addressed in This Session:   Client will engage in positive self-care.     Intervention:  Reviewed behaviors that have been helpful in improving mood. Client reports he is experiencing the beginnings of seasonal mood decline and increased anxiety.  Discussed interventions at home for seasonal mood difficulties. Processed emotions in session, validated, supportive counseling.    ASSESSMENT: Current Emotional / Mental Status (status of significant symptoms):   Risk status (Self / Other harm or suicidal ideation)   Client denies current fears or concerns for personal safety.   Client denies current or recent suicidal ideation or behaviors.   Client denies current or recent homicidal ideation or behaviors.   Client denies current or recent self injurious behavior or ideation.   Client denies other safety concerns.   A safety and risk management has not been developed at this time.  Client was given the Suicide Prevention National Hotline, Text [place] 952610, the [facility] Crisis Number, or encouraged to Call 911 should there be a change in these risk factors.       Appearance:   Appropriate    Eye Contact:   Good    Psychomotor Behavior: Normal    Attitude:   Friendly   Orientation:   All   Speech    Rate / Production: Normal     Volume:  Normal    Mood:    Anxious    Affect:    Appropriate    Thought Content:  Clear    Thought Form:  Coherent  Logical    Insight:    Good      Medication Review:   No current psychiatric medications prescribed.      Medication Compliance:   NA     Changes in Health Issues:   None reported     Chemical Use Review:   Substance Use: Chemical use reviewed, no active concerns identified      Tobacco Use: No current tobacco use.       Diagnosis:   Major Depressive Disorder, Single Episode, Moderate     Generalized Anxiety Disorder       Collateral Reports Completed:   Not Applicable    PLAN: (Client Tasks / Therapist Tasks / Other)  Client will continue strong self-care performance and continue daily meditation. JAKE lópez consider seasonal mood interventions, such as light therapy.      Amrit Purecll MA, LMFT                                                       ________________________________________________________________________                                                  Treatment Plan    Client's Name: Keon Davis  YOB: 1995    Date: October 4, 2021       DSM-V Diagnoses: 296.22 - Major Depressive Disorder, Single Episode, Moderate    300.02 - Generalized Anxiety Disorder  ; V71.09 - No Diagnosis  Psychosocial / Contextual Factors: relational conflict    Referral / Collaboration:  Referral to another professional/service is not indicated at this time..    Anticipated number of session or this episode of care: 26    Measurable Treatment Goal(s) related to diagnosis / functional impairment(s)   I will know I've met my goal when I have better tools to control my emotions   Goal 1: Client will achieve a significant reduction in PHQ-9 scores.   Objective #A (Client Action)   Client will identify cognitive distortions and negative self-talk contributing to depression.   Status: Continued: October 4, 2021  Intervention(s)   Therapist will teach about identification and strategies to counter negative self-talk and cognitive distortions.  Objective #B (Client Action)   Client will learn and integrate CBT/DBT strategies for more effectively managing emotions and relationships.   Status: Continued: October 4, 2021  Intervention(s)   Therapist will teach emotional regulation skills distress tolerance skills, interpersonal effectiveness skills and mindfulness skills.   Objective #C   Client will engage in positive self-care.  Status: Continued: October 4, 2021  Intervention(s)   Therapist will teach self-care goals:  Maintain balance in schedule (time for self/others, relaxation/activities, leisure/tasks, home/out of the house), assert needs and set limits with others, challenge negative thoughts/use affirming and encouraging self-talk, engage with support people on regular basis, practice behavior activation behaviors (sleep hygiene, balanced eating, physical activity, medical needs, personal hygiene), acknowledge and accept your  feelings.    Client has reviewed and agreed to the above plan.      Amrit Purcell MA, LMFT October 4, 2021

## 2021-10-06 ASSESSMENT — ANXIETY QUESTIONNAIRES: GAD7 TOTAL SCORE: 9

## 2021-10-23 ENCOUNTER — HEALTH MAINTENANCE LETTER (OUTPATIENT)
Age: 26
End: 2021-10-23

## 2021-10-25 ENCOUNTER — VIRTUAL VISIT (OUTPATIENT)
Dept: PSYCHOLOGY | Facility: CLINIC | Age: 26
End: 2021-10-25
Payer: COMMERCIAL

## 2021-10-25 DIAGNOSIS — F32.1 MAJOR DEPRESSIVE DISORDER, SINGLE EPISODE, MODERATE (H): Primary | ICD-10-CM

## 2021-10-25 DIAGNOSIS — F41.1 GAD (GENERALIZED ANXIETY DISORDER): ICD-10-CM

## 2021-10-25 PROCEDURE — 90834 PSYTX W PT 45 MINUTES: CPT | Mod: GT | Performed by: MARRIAGE & FAMILY THERAPIST

## 2021-10-26 NOTE — PROGRESS NOTES
Progress Note    Client Name: Keon Davis  Date: October 25, 2021                   Service Type: Individual    Video Visit: Yes    Telemedicine Visit: The patient's condition can be safely assessed and treated via synchronous audio and visual telemedicine encounter.    Reason for Telemedicine Visit: Services only offered telehealth and due to COVID-19 restrictions  Originating Site (Patient Location): Patient's home  Distant Site (Provider Location): Provider Remote Setting  Consent:  The patient/guardian has verbally consented to: the potential risks and benefits of telemedicine (video visit) versus in person care; bill my insurance or make self-payment for services provided; and responsibility for payment of non-covered services.   Mode of Communication:  Video Conference via [email or web address]  As the provider I attest to compliance with applicable laws and regulations related to telemedicine.     Session Start Time: 1:30  Session End Time: 2:15      Session Length: 45 min     Session #: 35     Attendees: Client attended alone    Treatment Plan Last Reviewed: October 4, 2021     DATA  Interactive Complexity: No  Crisis: No             Progress Since Last Session (Related to Symptoms / Goals / Homework):   Symptoms: Worsening with change in season.    Homework: Achieved / completed to satisfaction      Episode of Care Goals: Satisfactory progress - MAINTENANCE (Working to maintain change, with risk of relapse); Intervened by continuing to positively reinforce healthy behavior choice      Current / Ongoing Stressors and Concerns:   Stressful job - relationship conflict  - hypervigilance - pandemic     Treatment Objective(s) Addressed in This Session:   Client will engage in positive self-care.     Intervention:  Reviewed behaviors that have been helpful in improving mood. Client reports he is experiencing the beginnings of seasonal mood decline and increased anxiety.  Discussed ongoing interventions at home for seasonal mood difficulties, including light therapy and consistent daily meditation. Processed emotions in session, validated, supportive counseling.    ASSESSMENT: Current Emotional / Mental Status (status of significant symptoms):   Risk status (Self / Other harm or suicidal ideation)   Client denies current fears or concerns for personal safety.   Client denies current or recent suicidal ideation or behaviors.   Client denies current or recent homicidal ideation or behaviors.   Client denies current or recent self injurious behavior or ideation.   Client denies other safety concerns.   A safety and risk management has not been developed at this time.  Client was given the Suicide Prevention National Hotline, Text [place] 334484, the [facility] Crisis Number, or encouraged to Call 911 should there be a change in these risk factors.       Appearance:   Appropriate    Eye Contact:   Good    Psychomotor Behavior: Normal    Attitude:   Pleasant   Orientation:   All   Speech    Rate / Production: Normal     Volume:  Normal    Mood:    Anxious  Depressed    Affect:    Appropriate    Thought Content:  Clear    Thought Form:  Coherent  Logical    Insight:    Good      Medication Review:   No current psychiatric medications prescribed.      Medication Compliance:   NA     Changes in Health Issues:   None reported     Chemical Use Review:   Substance Use: Chemical use reviewed, no active concerns identified      Tobacco Use: No current tobacco use.       Diagnosis:   Major Depressive Disorder, Single Episode, Moderate     Generalized Anxiety Disorder       Collateral Reports Completed:   Not Applicable    PLAN: (Client Tasks / Therapist Tasks / Other)  Client will continue strong self-care performance, daily meditation, and light therapy.      Amrit Purcell MA, LMFT                                                        ________________________________________________________________________                                                 Treatment Plan    Client's Name: Keon Davis  YOB: 1995    Date: October 4, 2021       DSM-V Diagnoses: 296.22 - Major Depressive Disorder, Single Episode, Moderate    300.02 - Generalized Anxiety Disorder  ; V71.09 - No Diagnosis  Psychosocial / Contextual Factors: relational conflict    Referral / Collaboration:  Referral to another professional/service is not indicated at this time..    Anticipated number of session or this episode of care: 26    Measurable Treatment Goal(s) related to diagnosis / functional impairment(s)   I will know I've met my goal when I have better tools to control my emotions   Goal 1: Client will achieve a significant reduction in PHQ-9 scores.   Objective #A (Client Action)   Client will identify cognitive distortions and negative self-talk contributing to depression.   Status: Continued: October 4, 2021  Intervention(s)   Therapist will teach about identification and strategies to counter negative self-talk and cognitive distortions.  Objective #B (Client Action)   Client will learn and integrate CBT/DBT strategies for more effectively managing emotions and relationships.   Status: Continued: October 4, 2021  Intervention(s)   Therapist will teach emotional regulation skills distress tolerance skills, interpersonal effectiveness skills and mindfulness skills.   Objective #C   Client will engage in positive self-care.  Status: Continued: October 4, 2021  Intervention(s)   Therapist will teach self-care goals:  Maintain balance in schedule (time for self/others, relaxation/activities, leisure/tasks, home/out of the house), assert needs and set limits with others, challenge negative thoughts/use affirming and encouraging self-talk, engage with support people on regular basis, practice behavior activation behaviors (sleep hygiene, balanced  eating, physical activity, medical needs, personal hygiene), acknowledge and accept your feelings.    Client has reviewed and agreed to the above plan.      Amrit Purcell MA, LMFT October 4, 2021

## 2022-02-12 ENCOUNTER — HEALTH MAINTENANCE LETTER (OUTPATIENT)
Age: 27
End: 2022-02-12

## 2022-10-10 ENCOUNTER — HEALTH MAINTENANCE LETTER (OUTPATIENT)
Age: 27
End: 2022-10-10

## 2023-02-18 ENCOUNTER — HEALTH MAINTENANCE LETTER (OUTPATIENT)
Age: 28
End: 2023-02-18

## 2023-05-23 ENCOUNTER — VIRTUAL VISIT (OUTPATIENT)
Dept: PSYCHOLOGY | Facility: CLINIC | Age: 28
End: 2023-05-23
Payer: COMMERCIAL

## 2023-05-23 DIAGNOSIS — F32.1 MAJOR DEPRESSIVE DISORDER, SINGLE EPISODE, MODERATE (H): Primary | ICD-10-CM

## 2023-05-23 DIAGNOSIS — F41.1 GAD (GENERALIZED ANXIETY DISORDER): ICD-10-CM

## 2023-05-23 PROCEDURE — 90834 PSYTX W PT 45 MINUTES: CPT | Mod: VID | Performed by: MARRIAGE & FAMILY THERAPIST

## 2023-05-25 ASSESSMENT — COLUMBIA-SUICIDE SEVERITY RATING SCALE - C-SSRS
2. HAVE YOU ACTUALLY HAD ANY THOUGHTS OF KILLING YOURSELF?: NO
TOTAL  NUMBER OF INTERRUPTED ATTEMPTS SINCE LAST CONTACT: NO
TOTAL  NUMBER OF ABORTED OR SELF INTERRUPTED ATTEMPTS SINCE LAST CONTACT: NO
ATTEMPT SINCE LAST CONTACT: NO
6. HAVE YOU EVER DONE ANYTHING, STARTED TO DO ANYTHING, OR PREPARED TO DO ANYTHING TO END YOUR LIFE?: NO
1. SINCE LAST CONTACT, HAVE YOU WISHED YOU WERE DEAD OR WISHED YOU COULD GO TO SLEEP AND NOT WAKE UP?: NO
SUICIDE, SINCE LAST CONTACT: NO

## 2023-05-25 ASSESSMENT — ANXIETY QUESTIONNAIRES
GAD7 TOTAL SCORE: 6
7. FEELING AFRAID AS IF SOMETHING AWFUL MIGHT HAPPEN: NOT AT ALL
IF YOU CHECKED OFF ANY PROBLEMS ON THIS QUESTIONNAIRE, HOW DIFFICULT HAVE THESE PROBLEMS MADE IT FOR YOU TO DO YOUR WORK, TAKE CARE OF THINGS AT HOME, OR GET ALONG WITH OTHER PEOPLE: SOMEWHAT DIFFICULT
5. BEING SO RESTLESS THAT IT IS HARD TO SIT STILL: SEVERAL DAYS
2. NOT BEING ABLE TO STOP OR CONTROL WORRYING: SEVERAL DAYS
1. FEELING NERVOUS, ANXIOUS, OR ON EDGE: SEVERAL DAYS
GAD7 TOTAL SCORE: 6
6. BECOMING EASILY ANNOYED OR IRRITABLE: SEVERAL DAYS
3. WORRYING TOO MUCH ABOUT DIFFERENT THINGS: SEVERAL DAYS

## 2023-05-25 ASSESSMENT — PATIENT HEALTH QUESTIONNAIRE - PHQ9
5. POOR APPETITE OR OVEREATING: SEVERAL DAYS
SUM OF ALL RESPONSES TO PHQ QUESTIONS 1-9: 6

## 2023-05-25 NOTE — PROGRESS NOTES
Progress Note    Client Name: Keon Davis  Date: May 23, 2023                    Service Type: Individual    Video Visit: Yes    Telemedicine Visit: The patient's condition can be safely assessed and treated via synchronous audio and visual telemedicine encounter.    Reason for Telemedicine Visit: Services only offered telehealth and due to COVID-19 restrictions  Originating Site (Patient Location): Patient's home  Distant Site (Provider Location): Provider Remote Setting  Consent:  The patient/guardian has verbally consented to: the potential risks and benefits of telemedicine (video visit) versus in person care; bill my insurance or make self-payment for services provided; and responsibility for payment of non-covered services.   Mode of Communication:  Video Conference via [email or web address]  As the provider I attest to compliance with applicable laws and regulations related to telemedicine.     Session Start Time: 10:00  Session End Time: 10:45      Session Length: 45 min     Session #: 36     Attendees: Client attended alone    Treatment Plan Last Reviewed: May 23, 2023     DATA  Interactive Complexity: No  Crisis: No             Progress Since Last Session (Related to Symptoms / Goals / Homework):   Symptoms: Improving .    Homework: Achieved / completed to satisfaction      Episode of Care Goals: Satisfactory progress - MAINTENANCE (Working to maintain change, with risk of relapse); Intervened by continuing to positively reinforce healthy behavior choice      Current / Ongoing Stressors and Concerns:   Stressful job - relationship conflict  - hypervigilance - pandemic     Treatment Objective(s) Addressed in This Session:   Client will engage in positive self-care.     Intervention:  .. Reviewed emotion regulation, distress tolerance, interpersonal effectiveness, mindfulness and self-care strategies that have been useful to gain improvement of symptoms.  Client  reports he is doing well emotionally and using skills to strong effect. HE says he wanted to check in. Processed emotions in session, validated, supportive counseling. Guidance offered to better utilize client's coping skills.    ASSESSMENT: Current Emotional / Mental Status (status of significant symptoms):   Risk status (Self / Other harm or suicidal ideation)   Client denies current fears or concerns for personal safety.   Client denies current or recent suicidal ideation or behaviors.   Client denies current or recent homicidal ideation or behaviors.   Client denies current or recent self injurious behavior or ideation.   Client denies other safety concerns.   A safety and risk management has not been developed at this time.  Client was given the Suicide Prevention National Hotline, Text [place] 152286, the [facility] Crisis Number, or encouraged to Call 911 should there be a change in these risk factors.       Appearance:   Appropriate    Eye Contact:   Good    Psychomotor Behavior: Normal    Attitude:   Friendly   Orientation:   All   Speech    Rate / Production: Normal     Volume:  Normal    Mood:    Euthymic   Affect:    Appropriate    Thought Content:  Clear    Thought Form:   Coherent  Logical    Insight:    Good      Medication Review:   No current psychiatric medications prescribed.      Medication Compliance:   NA     Changes in Health Issues:   None reported     Chemical Use Review:   Substance Use: Chemical use reviewed, no active concerns identified      Tobacco Use: No current tobacco use.       Diagnosis:   Major Depressive Disorder, Single Episode, Moderate     Generalized Anxiety Disorder       Collateral Reports Completed:   Not Applicable    PLAN: (Client Tasks / Therapist Tasks / Other)  Client will continue strong self-care performance, daily meditation, and light therapy.      Amrit Purcell MA, LMFT                                                        ________________________________________________________________________                                                 Treatment Plan    Client's Name: Keon Davis  YOB: 1995    Date: May 23, 2023      DSM-V Diagnoses: 296.22 - Major Depressive Disorder, Single Episode, Moderate    300.02 - Generalized Anxiety Disorder  ; V71.09 - No Diagnosis  Psychosocial / Contextual Factors: relational conflict    Referral / Collaboration:  Referral to another professional/service is not indicated at this time..    Anticipated number of session or this episode of care: 26    Measurable Treatment Goal(s) related to diagnosis / functional impairment(s)   I will know I've met my goal when I have better tools to control my emotions   Goal 1: Client will achieve a significant reduction in PHQ-9 scores.   Objective #A (Client Action)   Client will identify cognitive distortions and negative self-talk contributing to depression.   Status: Continued: May 23, 2023  Intervention(s)   Therapist will teach about identification and strategies to counter negative self-talk and cognitive distortions.  Objective #B (Client Action)   Client will learn and integrate CBT/DBT strategies for more effectively managing emotions and relationships.   Status: Continued: May 23, 2023  Intervention(s)   Therapist will teach emotional regulation skills distress tolerance skills, interpersonal effectiveness skills and mindfulness skills.   Objective #C   Client will engage in positive self-care.  Status: Continued: May 23, 2023  Intervention(s)   Therapist will teach self-care goals:  Maintain balance in schedule (time for self/others, relaxation/activities, leisure/tasks, home/out of the house), assert needs and set limits with others, challenge negative thoughts/use affirming and encouraging self-talk, engage with support people on regular basis, practice behavior activation behaviors (sleep hygiene, balanced eating,  physical activity, medical needs, personal hygiene), acknowledge and accept your feelings.    Client has reviewed and agreed to the above plan.      Amrit Purcell MA, LMFT May 23, 2023

## 2023-06-06 ENCOUNTER — VIRTUAL VISIT (OUTPATIENT)
Dept: PSYCHOLOGY | Facility: CLINIC | Age: 28
End: 2023-06-06
Payer: COMMERCIAL

## 2023-06-06 DIAGNOSIS — F41.1 GAD (GENERALIZED ANXIETY DISORDER): ICD-10-CM

## 2023-06-06 DIAGNOSIS — F32.1 MAJOR DEPRESSIVE DISORDER, SINGLE EPISODE, MODERATE (H): Primary | ICD-10-CM

## 2023-06-06 PROCEDURE — 90834 PSYTX W PT 45 MINUTES: CPT | Mod: VID | Performed by: MARRIAGE & FAMILY THERAPIST

## 2023-06-08 NOTE — PROGRESS NOTES
Progress Note    Client Name: Keon Davis  Date: June 6, 2023                    Service Type: Individual    Video Visit: Yes    Telemedicine Visit: The patient's condition can be safely assessed and treated via synchronous audio and visual telemedicine encounter.    Reason for Telemedicine Visit: Services only offered telehealth and due to COVID-19 restrictions  Originating Site (Patient Location): Patient's home  Distant Site (Provider Location): Provider Remote Setting  Consent:  The patient/guardian has verbally consented to: the potential risks and benefits of telemedicine (video visit) versus in person care; bill my insurance or make self-payment for services provided; and responsibility for payment of non-covered services.   Mode of Communication:  Video Conference via [email or web address]  As the provider I attest to compliance with applicable laws and regulations related to telemedicine.     Session Start Time: 12:00  Session End Time: 12:30      Session Length: 45 min     Session #: 37     Attendees: Client attended alone    Treatment Plan Last Reviewed: May 23, 2023     DATA  Interactive Complexity: No  Crisis: No             Progress Since Last Session (Related to Symptoms / Goals / Homework):   Symptoms: Improving .    Homework: Achieved / completed to satisfaction      Episode of Care Goals: Satisfactory progress - MAINTENANCE (Working to maintain change, with risk of relapse); Intervened by continuing to positively reinforce healthy behavior choice      Current / Ongoing Stressors and Concerns:   - friend murdered   - job stress    - relationship conflict     - hypervigilance     Treatment Objective(s) Addressed in This Session:   increase understanding of steps in the grief process     Intervention:  Grief process and coping strategies, including strong self-care behaviors.  Client reports a friend and colleague of his and his wife was murdered this week by  the friend's partner. Processed emotions in session, validated, supportive counseling. Guidance offered to better utilize client's coping skills.    ASSESSMENT: Current Emotional / Mental Status (status of significant symptoms):   Risk status (Self / Other harm or suicidal ideation)   Client denies current fears or concerns for personal safety.   Client denies current or recent suicidal ideation or behaviors.   Client denies current or recent homicidal ideation or behaviors.   Client denies current or recent self injurious behavior or ideation.   Client denies other safety concerns.   A safety and risk management has not been developed at this time.  Client was given the Suicide Prevention National Hotline, Text [place] 560937, the [facility] Crisis Number, or encouraged to Call 911 should there be a change in these risk factors.       Appearance:   Appropriate    Eye Contact:   Good    Psychomotor Behavior: Normal    Attitude:   Cooperative    Orientation:   All   Speech    Rate / Production: Normal     Volume:  Normal    Mood:    Grieving   Affect:    Appropriate    Thought Content:  Clear    Thought Form:   Coherent  Logical    Insight:    Good      Medication Review:   No current psychiatric medications prescribed.      Medication Compliance:   NA     Changes in Health Issues:   None reported     Chemical Use Review:   Substance Use: Chemical use reviewed, no active concerns identified      Tobacco Use: No current tobacco use.       Diagnosis:   Major Depressive Disorder, Single Episode, Moderate     Generalized Anxiety Disorder       Collateral Reports Completed:   Not Applicable    PLAN: (Client Tasks / Therapist Tasks / Other)  Client will employ grief coping strategies, including strong self-care behaviors..      Amrit Purcell MA, LMFT                                                       ________________________________________________________________________                                                 Treatment  Plan    Client's Name: Keon Davis  YOB: 1995    Date: May 23, 2023      DSM-V Diagnoses: 296.22 - Major Depressive Disorder, Single Episode, Moderate    300.02 - Generalized Anxiety Disorder  ; V71.09 - No Diagnosis  Psychosocial / Contextual Factors: relational conflict    Referral / Collaboration:  Referral to another professional/service is not indicated at this time..    Anticipated number of session or this episode of care: 26    Measurable Treatment Goal(s) related to diagnosis / functional impairment(s)   I will know I've met my goal when I have better tools to control my emotions   Goal 1: Client will achieve a significant reduction in PHQ-9 scores.   Objective #A (Client Action)   Client will identify cognitive distortions and negative self-talk contributing to depression.   Status: Continued: May 23, 2023  Intervention(s)   Therapist will teach about identification and strategies to counter negative self-talk and cognitive distortions.  Objective #B (Client Action)   Client will learn and integrate CBT/DBT strategies for more effectively managing emotions and relationships.   Status: Continued: May 23, 2023  Intervention(s)   Therapist will teach emotional regulation skills distress tolerance skills, interpersonal effectiveness skills and mindfulness skills.   Objective #C   Client will engage in positive self-care.  Status: Continued: May 23, 2023  Intervention(s)   Therapist will teach self-care goals:  Maintain balance in schedule (time for self/others, relaxation/activities, leisure/tasks, home/out of the house), assert needs and set limits with others, challenge negative thoughts/use affirming and encouraging self-talk, engage with support people on regular basis, practice behavior activation behaviors (sleep hygiene, balanced eating, physical activity, medical needs, personal hygiene), acknowledge and accept your feelings.    Client has reviewed and agreed to the above  plan.      Amrit Purcell MA, LMFT May 23, 2023

## 2023-12-18 ENCOUNTER — VIRTUAL VISIT (OUTPATIENT)
Dept: PSYCHOLOGY | Facility: CLINIC | Age: 28
End: 2023-12-18
Payer: COMMERCIAL

## 2023-12-18 DIAGNOSIS — F41.1 GAD (GENERALIZED ANXIETY DISORDER): ICD-10-CM

## 2023-12-18 DIAGNOSIS — F32.1 MAJOR DEPRESSIVE DISORDER, SINGLE EPISODE, MODERATE (H): Primary | ICD-10-CM

## 2023-12-18 PROCEDURE — 90834 PSYTX W PT 45 MINUTES: CPT | Mod: VID | Performed by: MARRIAGE & FAMILY THERAPIST

## 2023-12-20 ASSESSMENT — COLUMBIA-SUICIDE SEVERITY RATING SCALE - C-SSRS
ATTEMPT SINCE LAST CONTACT: NO
6. HAVE YOU EVER DONE ANYTHING, STARTED TO DO ANYTHING, OR PREPARED TO DO ANYTHING TO END YOUR LIFE?: NO
TOTAL  NUMBER OF ABORTED OR SELF INTERRUPTED ATTEMPTS SINCE LAST CONTACT: NO
SUICIDE, SINCE LAST CONTACT: NO
TOTAL  NUMBER OF INTERRUPTED ATTEMPTS SINCE LAST CONTACT: NO
2. HAVE YOU ACTUALLY HAD ANY THOUGHTS OF KILLING YOURSELF?: NO
1. SINCE LAST CONTACT, HAVE YOU WISHED YOU WERE DEAD OR WISHED YOU COULD GO TO SLEEP AND NOT WAKE UP?: NO

## 2023-12-20 ASSESSMENT — ANXIETY QUESTIONNAIRES
GAD7 TOTAL SCORE: 8
2. NOT BEING ABLE TO STOP OR CONTROL WORRYING: SEVERAL DAYS
7. FEELING AFRAID AS IF SOMETHING AWFUL MIGHT HAPPEN: SEVERAL DAYS
6. BECOMING EASILY ANNOYED OR IRRITABLE: SEVERAL DAYS
1. FEELING NERVOUS, ANXIOUS, OR ON EDGE: MORE THAN HALF THE DAYS
3. WORRYING TOO MUCH ABOUT DIFFERENT THINGS: SEVERAL DAYS
IF YOU CHECKED OFF ANY PROBLEMS ON THIS QUESTIONNAIRE, HOW DIFFICULT HAVE THESE PROBLEMS MADE IT FOR YOU TO DO YOUR WORK, TAKE CARE OF THINGS AT HOME, OR GET ALONG WITH OTHER PEOPLE: SOMEWHAT DIFFICULT
5. BEING SO RESTLESS THAT IT IS HARD TO SIT STILL: SEVERAL DAYS
GAD7 TOTAL SCORE: 8

## 2023-12-20 ASSESSMENT — PATIENT HEALTH QUESTIONNAIRE - PHQ9
SUM OF ALL RESPONSES TO PHQ QUESTIONS 1-9: 7
5. POOR APPETITE OR OVEREATING: SEVERAL DAYS

## 2023-12-20 NOTE — PROGRESS NOTES
[facility]                                     Progress Note    Patient Name: Keon Davis Date: 12/18/23       Service Type: Individual      Session Start Time: 1:30  Session End Time: 2:15     Session Length: 45 min    Session #: 38    Attendees: Client attended alone    Service Modality:  Video Visit:      Provider verified identity through the following two step process.  Patient provided:  Patient is known previously to provider    Telemedicine Visit: The patient's condition can be safely assessed and treated via synchronous audio and visual telemedicine encounter.      Reason for Telemedicine Visit: Patient has requested telehealth visit    Originating Site (Patient Location): Patient's home    Distant Site (Provider Location): Provider Remote Setting- Home Office    Consent:  The patient/guardian has verbally consented to: the potential risks and benefits of telemedicine (video visit) versus in person care; bill my insurance or make self-payment for services provided; and responsibility for payment of non-covered services.     Mode of Communication:  Video Conference via Doximity    Distant Location (Provider):  Off-site    As the provider I attest to compliance with applicable laws and regulations related to telemedicine.     Treatment Plan Last Reviewed: December 18, 2023      DATA  Interactive Complexity: No  Crisis: No             Progress Since Last Session (Related to Symptoms / Goals / Homework):   Symptoms: Worsening .    Homework: Achieved / completed to satisfaction      Episode of Care Goals: Satisfactory progress - MAINTENANCE (Working to maintain change, with risk of relapse); Intervened by continuing to positively reinforce healthy behavior choice      Current / Ongoing Stressors and Concerns:   - seasonal lower mood   - job stress    - relationship conflict     - hypervigilance     Treatment Objective(s) Addressed in This Session:   Client will engage in  positive self-care.     Intervention:  Reviewed self-care goals, performance and behavioral activation strategies to overcome obstacles.  Client reports he is experiencing an annual seasonal lower mood as the days are colder and darker. 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 HE says he is still rock climbing multiple times per week, but wants to re-start his meditation practice. Processed emotions in session, validated, supportive counseling. Guidance offered to better utilize client's coping skills.    Assessments completed prior to visit:  The following assessments were completed by patient for this visit:  PHQ9:       6/12/2019     3:44 PM 5/1/2020     3:33 PM 2/16/2021     9:38 AM 6/28/2021     3:47 PM 10/5/2021     1:10 PM 5/25/2023     3:08 PM 12/20/2023    12:56 PM   PHQ-9 SCORE   PHQ-9 Total Score 7 7 7 9 6 6 7     GAD7:       6/12/2019     3:44 PM 5/1/2020     3:33 PM 2/16/2021     9:38 AM 6/28/2021     3:47 PM 10/5/2021     1:10 PM 5/25/2023     3:08 PM 12/20/2023    12:56 PM   SIOBHAN-7 SCORE   Total Score 4 9 7 10 9 6 8     [place] Suicide Severity Rating Scale (Short Version)      5/1/2020     3:35 PM 2/16/2021     9:39 AM 6/28/2021     3:48 PM 10/5/2021     1:11 PM 5/25/2023     3:08 PM 12/20/2023    12:57 PM   [place] Suicide Severity Rating (Short Version)   Over the past 2 weeks have you felt down, depressed, or hopeless? yes yes yes yes     Over the past 2 weeks have you had thoughts of killing yourself? no no no no     Have you ever attempted to kill yourself? no no no no     1. Wish to be Dead (Since Last Contact)     N N   2. Non-Specific Active Suicidal Thoughts (Since Last Contact)     N N   Actual Attempt (Since Last Contact)     N N   Has subject engaged in non-suicidal self-injurious behavior? (Since Last Contact)     N N   Interrupted Attempts (Since Last Contact)     N N   Aborted or Self-Interrupted Attempt (Since Last  Contact)     N N   Preparatory Acts or Behavior (Since Last Contact)     N N   Suicide (Since Last Contact)     N N   Calculated C-SSRS Risk Score (Since Last Contact)     No Risk Indicated No Risk Indicated          ASSESSMENT: Current Emotional / Mental Status (status of significant symptoms):   Risk status (Self / Other harm or suicidal ideation)   Client denies current fears or concerns for personal safety.   Client denies current or recent suicidal ideation or behaviors.   Client denies current or recent homicidal ideation or behaviors.   Client denies current or recent self injurious behavior or ideation.   Client denies other safety concerns.   A safety and risk management has not been developed at this time.  Client was given the Suicide Prevention National Hotline, Text [place] 435676, the [facility] Crisis Number, or encouraged to Call 911 should there be a change in these risk factors.       Appearance:   Appropriate    Eye Contact:   Good    Psychomotor Behavior: Normal    Attitude:   Interested   Orientation:   All   Speech    Rate / Production: Normal     Volume:  Normal    Mood:    Anxious  Depressed    Affect:    Appropriate    Thought Content:  Clear    Thought Form:  Coherent  Logical    Insight:    Good      Medication Review:   No current psychiatric medications prescribed.      Medication Compliance:   NA     Changes in Health Issues:   None reported     Chemical Use Review:   Substance Use: Chemical use reviewed, no active concerns identified      Tobacco Use: No current tobacco use.       Diagnosis:   Major Depressive Disorder, Single Episode, Moderate     Generalized Anxiety Disorder       Collateral Reports Completed:   Not Applicable    PLAN: (Client Tasks / Therapist Tasks / Other)  Client will employ self-care and behavioral activation strategies to improve mood.      Amrit Purcell MA, LMFT                                                        ________________________________________________________________________                                                 Treatment Plan    Client's Name: Keon Davis  YOB: 1995    Date: December 18, 2023      DSM-V Diagnoses: 296.22 - Major Depressive Disorder, Single Episode, Moderate    300.02 - Generalized Anxiety Disorder  ; V71.09 - No Diagnosis  Psychosocial / Contextual Factors: relational conflict    Referral / Collaboration:  Referral to another professional/service is not indicated at this time..    Anticipated number of session or this episode of care: 26    Measurable Treatment Goal(s) related to diagnosis / functional impairment(s)   I will know I've met my goal when I have better tools to control my emotions   Goal 1: Client will achieve a significant reduction in PHQ-9 scores.   Objective #A (Client Action)   Client will identify cognitive distortions and negative self-talk contributing to depression.   Status: Continued: December 18, 2023  Intervention(s)   Therapist will teach about identification and strategies to counter negative self-talk and cognitive distortions.  Objective #B (Client Action)   Client will learn and integrate CBT/DBT strategies for more effectively managing emotions and relationships.   Status: Continued: December 18, 2023  Intervention(s)   Therapist will teach emotional regulation skills distress tolerance skills, interpersonal effectiveness skills and mindfulness skills.   Objective #C   Client will engage in positive self-care.  Status: Continued: December 18, 2023  Intervention(s)   Therapist will teach self-care goals:  Maintain balance in schedule (time for self/others, relaxation/activities, leisure/tasks, home/out of the house), assert needs and set limits with others, challenge negative thoughts/use affirming and encouraging self-talk, engage with support people on regular basis, practice behavior activation behaviors (sleep hygiene,  balanced eating, physical activity, medical needs, personal hygiene), acknowledge and accept your feelings.    Client has reviewed and agreed to the above plan.      Amrit Purcell MA, LMFT December 18, 2023

## 2024-01-15 ENCOUNTER — OFFICE VISIT (OUTPATIENT)
Dept: FAMILY MEDICINE | Facility: CLINIC | Age: 29
End: 2024-01-15
Payer: COMMERCIAL

## 2024-01-15 VITALS
WEIGHT: 158.12 LBS | DIASTOLIC BLOOD PRESSURE: 78 MMHG | HEART RATE: 71 BPM | RESPIRATION RATE: 18 BRPM | SYSTOLIC BLOOD PRESSURE: 120 MMHG | TEMPERATURE: 98.7 F | HEIGHT: 69 IN | BODY MASS INDEX: 23.42 KG/M2 | OXYGEN SATURATION: 98 %

## 2024-01-15 DIAGNOSIS — Z23 NEED FOR VACCINATION: ICD-10-CM

## 2024-01-15 DIAGNOSIS — Z01.84 IMMUNITY STATUS TESTING: ICD-10-CM

## 2024-01-15 DIAGNOSIS — Z11.59 NEED FOR HEPATITIS C SCREENING TEST: ICD-10-CM

## 2024-01-15 DIAGNOSIS — Z00.00 ENCOUNTER FOR ANNUAL PHYSICAL EXAM: Primary | ICD-10-CM

## 2024-01-15 DIAGNOSIS — Z91.09 ENVIRONMENTAL ALLERGIES: ICD-10-CM

## 2024-01-15 DIAGNOSIS — Z82.49 FAMILY HISTORY OF CORONARY ARTERY DISEASE: ICD-10-CM

## 2024-01-15 DIAGNOSIS — Z11.3 SCREEN FOR STD (SEXUALLY TRANSMITTED DISEASE): ICD-10-CM

## 2024-01-15 LAB
BASOPHILS # BLD AUTO: 0 10E3/UL (ref 0–0.2)
BASOPHILS NFR BLD AUTO: 0 %
EOSINOPHIL # BLD AUTO: 0.1 10E3/UL (ref 0–0.7)
EOSINOPHIL NFR BLD AUTO: 1 %
ERYTHROCYTE [DISTWIDTH] IN BLOOD BY AUTOMATED COUNT: 12 % (ref 10–15)
HCT VFR BLD AUTO: 45.8 % (ref 40–53)
HGB BLD-MCNC: 15.5 G/DL (ref 13.3–17.7)
IMM GRANULOCYTES # BLD: 0 10E3/UL
IMM GRANULOCYTES NFR BLD: 0 %
LYMPHOCYTES # BLD AUTO: 1.5 10E3/UL (ref 0.8–5.3)
LYMPHOCYTES NFR BLD AUTO: 23 %
MCH RBC QN AUTO: 29.9 PG (ref 26.5–33)
MCHC RBC AUTO-ENTMCNC: 33.8 G/DL (ref 31.5–36.5)
MCV RBC AUTO: 88 FL (ref 78–100)
MONOCYTES # BLD AUTO: 0.5 10E3/UL (ref 0–1.3)
MONOCYTES NFR BLD AUTO: 7 %
NEUTROPHILS # BLD AUTO: 4.6 10E3/UL (ref 1.6–8.3)
NEUTROPHILS NFR BLD AUTO: 69 %
PLATELET # BLD AUTO: 225 10E3/UL (ref 150–450)
RBC # BLD AUTO: 5.19 10E6/UL (ref 4.4–5.9)
WBC # BLD AUTO: 6.7 10E3/UL (ref 4–11)

## 2024-01-15 PROCEDURE — 90480 ADMN SARSCOV2 VAC 1/ONLY CMP: CPT | Performed by: FAMILY MEDICINE

## 2024-01-15 PROCEDURE — 90686 IIV4 VACC NO PRSV 0.5 ML IM: CPT | Performed by: FAMILY MEDICINE

## 2024-01-15 PROCEDURE — 87491 CHLMYD TRACH DNA AMP PROBE: CPT | Performed by: FAMILY MEDICINE

## 2024-01-15 PROCEDURE — 36415 COLL VENOUS BLD VENIPUNCTURE: CPT | Performed by: FAMILY MEDICINE

## 2024-01-15 PROCEDURE — 86780 TREPONEMA PALLIDUM: CPT | Performed by: FAMILY MEDICINE

## 2024-01-15 PROCEDURE — 90471 IMMUNIZATION ADMIN: CPT | Performed by: FAMILY MEDICINE

## 2024-01-15 PROCEDURE — 82248 BILIRUBIN DIRECT: CPT | Performed by: FAMILY MEDICINE

## 2024-01-15 PROCEDURE — 86706 HEP B SURFACE ANTIBODY: CPT | Performed by: FAMILY MEDICINE

## 2024-01-15 PROCEDURE — 87389 HIV-1 AG W/HIV-1&-2 AB AG IA: CPT | Performed by: FAMILY MEDICINE

## 2024-01-15 PROCEDURE — 87591 N.GONORRHOEAE DNA AMP PROB: CPT | Performed by: FAMILY MEDICINE

## 2024-01-15 PROCEDURE — 99385 PREV VISIT NEW AGE 18-39: CPT | Mod: 25 | Performed by: FAMILY MEDICINE

## 2024-01-15 PROCEDURE — 86803 HEPATITIS C AB TEST: CPT | Performed by: FAMILY MEDICINE

## 2024-01-15 PROCEDURE — 85025 COMPLETE CBC W/AUTO DIFF WBC: CPT | Performed by: FAMILY MEDICINE

## 2024-01-15 PROCEDURE — 99213 OFFICE O/P EST LOW 20 MIN: CPT | Mod: 25 | Performed by: FAMILY MEDICINE

## 2024-01-15 PROCEDURE — 91320 SARSCV2 VAC 30MCG TRS-SUC IM: CPT | Performed by: FAMILY MEDICINE

## 2024-01-15 PROCEDURE — 80053 COMPREHEN METABOLIC PANEL: CPT | Performed by: FAMILY MEDICINE

## 2024-01-15 PROCEDURE — 80061 LIPID PANEL: CPT | Performed by: FAMILY MEDICINE

## 2024-01-15 RX ORDER — FLUTICASONE PROPIONATE 50 MCG
2 SPRAY, SUSPENSION (ML) NASAL DAILY
Qty: 16 G | Refills: 11 | Status: SHIPPED | OUTPATIENT
Start: 2024-01-15

## 2024-01-15 ASSESSMENT — ENCOUNTER SYMPTOMS
HEARTBURN: 0
HEMATOCHEZIA: 0
FEVER: 0
SHORTNESS OF BREATH: 0
DIARRHEA: 0
ABDOMINAL PAIN: 0
NAUSEA: 0
WEAKNESS: 0
COUGH: 0
DIZZINESS: 0
ARTHRALGIAS: 0
PARESTHESIAS: 0
JOINT SWELLING: 0
CHILLS: 0
HEADACHES: 0
PALPITATIONS: 0
FREQUENCY: 0
CONSTIPATION: 0
MYALGIAS: 0
DYSURIA: 0
EYE PAIN: 0
HEMATURIA: 0
SORE THROAT: 0
NERVOUS/ANXIOUS: 0

## 2024-01-15 NOTE — PATIENT INSTRUCTIONS
-Thank you for choosing the [facility].  -It was a pleasure to see you today.  -Please take a look at the information below for more specific details regarding the treatment plan and recommendations.  -In this after visit summary is a list of your medications and specific instructions.  Please review this carefully as there may be changes made to your medication list.  -If there are any particular questions or concerns, please feel free to reach out to Dr. Oconnor.  -If any labs have been completed, we will reach out to you about results.  If the results are normal or not concerning, a letter or MyChart message will be sent to you.  If any follow-up is needed, either Dr. Oconnor or the nurse will give you a call.  If you have not heard regarding results after 2 weeks, please reach out to the clinic.    Patient Instructions:    -Continue using the Flonase as prescribed.  -Consider stopping the Flonase for a 2-3-week period and seeing how you do.  If symptoms worsen, thenrestart the Flonase.    -You are doing great.  -Continue to eat well.  Try to increase your servings of calcium as this can help your bones stay strong and healthy.  Follow a nutrition plan patient fruits and vegetables and low in fats and cholesterol.    -Be sure to eat 5-7 servings of fruits and vegetables each day.  -Find ways to stay active.  Try to get 150 minutes of moderate activity (where you are breathing faster and slightly sweating) each week.  -Try to maintain a body mass index (BMI) of 18.5-25 as this is considered a healthier weight range.  -Brush your teeth twice daily.  See a dentist every 6-12 months.  -Be sure to use sunblock with SPF 15 or greater when going outside for extended periods of time.  Sunblock should be used even when it is a cloudy day.  Do intermittent skin checks for any concerning skin changes.  Wearing a wide brimmed hat and sunglasses can also be helpful to protect your skin from the sun.  -Monitor  for any abnormal skin changes (such as new moles/spots, painful moles, changes in your old moles, wounds that will not heal, multiple colors noted in one lesion, lesions that are asymmetric or not circular, or anything that is concerning for you). If any of these are noted, please schedule an appointment to be seen.     -It is generally recommended for you to complete a health care directive or living will. These documents will be able to reflect your wishes and desire in the case that you are unable to express them yourself. Please let Dr. Oconnor know if you would like some assistance with this process.    Please seek immediate medical attention (go to the emergency room or urgent care) for the following reasons: worsening symptoms, or any concerning changes.      --------------------------------------------------------------------------------------------------------------------    -We are always looking for ways to improve.  You may be selected to receive a survey regarding your visit today.  We encourage you to complete the survey and provide specific, constructive feedback to help us improve our processes.  Thank you for your time!  -Please review the contact information listed on the after visit summary and in the electronic chart.  Below is the phone number that we have on file.  If there are any changes that are needed to be made, please reach out to the clinic.  [phone] (home)

## 2024-01-15 NOTE — PROGRESS NOTES
SUBJECTIVE:   eKon is a 28 year old, presenting for the following:  Physical        1/15/2024     2:50 PM   Additional Questions   Roomed by Eman H   Accompanied by Self     Flonase: taking for so long that patient is not quite sure if it is actually making a difference or not.  Discussed trial of being off medication and monitoring symptoms with plans to restart if symptoms were to return. They have pets and he gets more stuffy dependingo n the season. Some runny nose and itchy eyes.       Healthy Habits:     Getting at least 3 servings of Calcium per day:  Yes    Bi-annual eye exam:  NO    Dental care twice a year:  Yes    Sleep apnea or symptoms of sleep apnea:  None    Diet:  Regular (no restrictions)    Frequency of exercise:  4-5 days/week    Duration of exercise:  Other    Taking medications regularly:  Yes    Medication side effects:  None    Additional concerns today:  No      -Reviewed healthy eating and exercise.  -Skin cancer screening: No concerning skin changes expressed by patient.  -Smoking status:   Tobacco Use      Smoking status: Never        Passive exposure: Never      Smokeless tobacco: Never      -Family history: DM: none  CAD/MI: MGGrandpa  HTN: father    Family History   Problem Relation Age of Onset    Hypertension Father     Cancer Paternal Grandmother     Leukemia Paternal Grandmother     Cancer Paternal Grandfather     Coronary Artery Disease Other     Diabetes No family hx of        Preventative health recommendations, evaluation options, and risk/benefits of each were discussed with patient. Accepted recommendations were ordered. Otherwise, patient declined.  Health Maintenance Due   Topic Date Due    ANNUAL REVIEW OF  ORDERS  Never done    ADVANCE CARE PLANNING  Never done    DEPRESSION ACTION PLAN  Never done    HEPATITIS B IMMUNIZATION (1 of 3 - 3-dose series) Never done    HEPATITIS C SCREENING  Never done    HPV IMMUNIZATION (2 - Male 3-dose series) 04/22/2013    YEARLY  "PREVENTIVE VISIT  02/27/2020    INFLUENZA VACCINE (1) 09/01/2023    COVID-19 Vaccine (4 - 2023-24 season) 09/01/2023     Patient is uncertain if he has never completed the hep B vaccine series.  He does endorse that he cannot hold the vaccinations usually recommended.    -Immunizations due were reviewed.    Immunization History   Administered Date(s) Administered    COVID-19 Monovalent 18+ (Moderna) 04/06/2021, 05/04/2021, 12/14/2021    DTAP-IPV/HIB (PENTACEL) 1993, 1993, 07/20/1994    HEPA 01/04/2002, 08/05/2002    HIB (PRP-T) 1993    HPV 03/25/2013    Hib, Unspecified 1993, 1993, 07/20/1994    Historical DTP/aP 1993, 04/29/1998    Influenza (H1N1) 01/04/2010    Influenza Vaccine >6 months,quad, PF 10/11/2016    Influenza Vaccine, 6+MO IM (QUADRIVALENT W/PRESERVATIVES) 09/25/2019    MMR 07/20/1994, 04/29/1998    Poliovirus, inactivated (IPV) 04/29/1998    TD,PF 7+ (Tenivac) 05/16/2005    TDAP (Adacel,Boostrix) 02/27/2019    TDAP Vaccine (Adacel) 03/25/2013    Varicella 12/10/2010       -Labs: Laboratory recommendations reviewed with patient.        Have you ever done Advance Care Planning? (For example, a Health Directive, POLST, or a discussion with a medical provider or your loved ones about your wishes): [facility].    Social History     Tobacco Use    Smoking status: Never     Passive exposure: Never    Smokeless tobacco: Never   Substance Use Topics    Alcohol use: Yes     Comment: Alcoholic Drinks/day: \"occasionally\"           1/15/2024    12:19 PM   Alcohol Use   Prescreen: >3 drinks/day or >7 drinks/week? No     Last PSA: No results found for: \"PSA\"    Reviewed orders with patient. Reviewed health maintenance and updated orders accordingly - Yes    Reviewed and updated as needed this visit by clinical staff   Tobacco  Allergies  Meds     Fam Hx          Reviewed and updated as needed this visit by Provider         Amilcar Hx          No past medical history on file.   Past " "Surgical History:   Procedure Laterality Date    WISDOM TOOTH EXTRACTION         Review of Systems   Constitutional:  Negative for chills and fever.   HENT:  Negative for congestion, ear pain, hearing loss and sore throat.    Eyes:  Negative for pain and visual disturbance.   Respiratory:  Negative for cough and shortness of breath.    Cardiovascular:  Negative for chest pain, palpitations and peripheral edema.   Gastrointestinal:  Negative for abdominal pain, constipation, diarrhea, heartburn, hematochezia and nausea.   Genitourinary:  Negative for dysuria, frequency, genital sores, hematuria, impotence, penile discharge and urgency.   Musculoskeletal:  Negative for arthralgias, joint swelling and myalgias.   Skin:  Negative for rash.   Neurological:  Negative for dizziness, weakness, headaches and paresthesias.   Psychiatric/Behavioral:  Negative for mood changes. The patient is not nervous/anxious.        OBJECTIVE:   /78   Pulse 71   Temp 98.7  F (37.1  C) (Oral)   Resp 18   Ht 1.765 m (5' 9.49\")   Wt 71.7 kg (158 lb 1.9 oz)   SpO2 98%   BMI 23.02 kg/m      Physical Exam  GENERAL: healthy, alert and no distress  EYES: Eyes grossly normal to inspection, PERRL and conjunctivae and sclerae normal  HENT: ear canals and TM's normal, nose and mouth without ulcers or lesions  NECK: no adenopathy, no asymmetry, masses, or scars and thyroid normal to palpation  RESP: lungs clear to auscultation - no rales, rhonchi or wheezes  CV: regular rate and rhythm, normal S1 S2, no S3 or S4, no murmur, click or rub, no peripheral edema and peripheral pulses strong  ABDOMEN: soft, nontender, no hepatosplenomegaly, no masses and bowel sounds normal  MS: no gross musculoskeletal defects noted, no edema  SKIN: no suspicious lesions or rashes  NEURO: Normal strength and tone, mentation intact and speech normal  PSYCH: mentation appears normal, affect normal/bright    Diagnostic Test Results:  Labs reviewed in " Epic    ASSESSMENT/PLAN:     Patient Instructions:    -Continue using the Flonase as prescribed.  -Consider stopping the Flonase for a 2-3-week period and seeing how you do.  If symptoms worsen, thenrestart the Flonase.    -You are doing great.  -Continue to eat well.  Try to increase your servings of calcium as this can help your bones stay strong and healthy.  Follow a nutrition plan patient fruits and vegetables and low in fats and cholesterol.    -Be sure to eat 5-7 servings of fruits and vegetables each day.  -Find ways to stay active.  Try to get 150 minutes of moderate activity (where you are breathing faster and slightly sweating) each week.  -Try to maintain a body mass index (BMI) of 18.5-25 as this is considered a healthier weight range.  -Brush your teeth twice daily.  See a dentist every 6-12 months.  -Be sure to use sunblock with SPF 15 or greater when going outside for extended periods of time.  Sunblock should be used even when it is a cloudy day.  Do intermittent skin checks for any concerning skin changes.  Wearing a wide brimmed hat and sunglasses can also be helpful to protect your skin from the sun.  -Monitor for any abnormal skin changes (such as new moles/spots, painful moles, changes in your old moles, wounds that will not heal, multiple colors noted in one lesion, lesions that are asymmetric or not circular, or anything that is concerning for you). If any of these are noted, please schedule an appointment to be seen.     -It is generally recommended for you to complete a health care directive or living will. These documents will be able to reflect your wishes and desire in the case that you are unable to express them yourself. Please let Dr. Oconnor know if you would like some assistance with this process.    Please seek immediate medical attention (go to the emergency room or urgent care) for the following reasons: worsening symptoms, or any concerning changes.    Keon was seen today for  physical.  Diagnoses and all orders for this visit:    Encounter for annual physical exam  Family history of coronary artery disease  Comments:  MI in GreatGrandfather (maternal side)  -     Basic metabolic panel; Future  -     CBC with Platelets & Differential; Future  -     Hepatic function panel; Future  -     Lipid panel reflex to direct LDL Non-fasting; Future    Need for hepatitis C screening test  -     Hepatitis C Screen Reflex to HCV RNA Quant and Genotype; Future    Screen for STD (sexually transmitted disease)  -     HIV Antigen Antibody Combo Cascade; Future  -     Treponema Abs w Reflex to RPR and Titer; Future  -     NEISSERIA GONORRHOEA PCR; Future  -     CHLAMYDIA TRACHOMATIS PCR; Future    Need for vaccination  -     INFLUENZA VACCINE IM > 6 MONTHS VALENT IIV4 (AFLURIA/FLUZONE)  -     COVID-19 12+ (2023-24) (PFIZER)    Immunity status testing  -     Hepatitis B Surface Antibody; Future    Environmental allergies: Stable.  Continue medications below.  Refill given.  -     fluticasone (FLONASE) 50 MCG/ACT nasal spray; Spray 2 sprays into both nostrils daily      Patient has been advised of split billing requirements and indicates understanding: Yes (by nurse)      COUNSELING:   Preventative health recommendations reviewed.        He reports that he has never smoked. He has never been exposed to tobacco smoke. He has never used smokeless tobacco.      Sesar Oconnor MD  [facility] [place] [address]  Phone: [phone]  Fax: [phone]    1/17/2024  1:47 PM

## 2024-01-15 NOTE — LETTER
January 29, 2024      Keon Davis  [address] [place] [address]        Hello Keon Davis,      I hope you have been well since our last visit. Below are the results from the testing completed at the visit.      The total cholesterol and LDL or bad cholesterol are high.  The triglycerides, which is primarily affected by food, is good. The HDL or good cholesterol are in the normal range.  No medications are recommended at this time, though you should try to follow a diet that is rich in fruits and vegetables and low in fats and cholesterol.  In addition, find ways to stay active.  Doing aerobic activities (such as running, biking, etc.) will help improve the HDL or good cholesterol.      You do not have gonorrhea, chlamydia, syphilis, hepatitis C, HIV.      You have immunity to hepatitis B vaccine already.      The rest of the blood work is in the good range.      If there are any questions or concerns, please call the clinic or schedule an appointment for follow up.      Best wishes,               Sesar Oconnor MD   [facility]   1/16/2024  1:35 PM     Resulted Orders   Basic metabolic panel   Result Value Ref Range    Sodium 141 135 - 145 mmol/L      Comment:      Reference intervals for this test were updated on 09/26/2023 to more accurately reflect our healthy population. There may be differences in the flagging of prior results with similar values performed with this method. Interpretation of those prior results can be made in the context of the updated reference intervals.     Potassium 3.5 3.4 - 5.3 mmol/L    Chloride 101 98 - 107 mmol/L    Carbon Dioxide (CO2) 27 22 - 29 mmol/L    Anion Gap 13 7 - 15 mmol/L    Urea Nitrogen 18.2 6.0 - 20.0 mg/dL    Creatinine 0.88 0.67 - 1.17 mg/dL    GFR Estimate >90 >60 mL/min/1.73m2    Calcium 9.6 8.6 - 10.0 mg/dL    Glucose 103 (H) 70 - 99 mg/dL   Hepatic function panel   Result Value Ref Range    Protein Total  7.7 6.4 - 8.3 g/dL    Albumin 4.9 3.5 - 5.2 g/dL    Bilirubin Total 0.3 <=1.2 mg/dL    Alkaline Phosphatase 80 40 - 150 U/L      Comment:      Reference intervals for this test were updated on 11/14/2023 to more accurately reflect our healthy population. There may be differences in the flagging of prior results with similar values performed with this method. Interpretation of those prior results can be made in the context of the updated reference intervals.    AST 29 0 - 45 U/L      Comment:      Reference intervals for this test were updated on 6/12/2023 to more accurately reflect our healthy population. There may be differences in the flagging of prior results with similar values performed with this method. Interpretation of those prior results can be made in the context of the updated reference intervals.    ALT 29 0 - 70 U/L      Comment:      Reference intervals for this test were updated on 6/12/2023 to more accurately reflect our healthy population. There may be differences in the flagging of prior results with similar values performed with this method. Interpretation of those prior results can be made in the context of the updated reference intervals.      Bilirubin Direct <0.20 0.00 - 0.30 mg/dL   Hepatitis B Surface Antibody   Result Value Ref Range    Hepatitis B Surface Antibody Reactive       Comment:      A reactive result indicates recovery from acute or chronic hepatitis B virus (HBV) infection or acquired immunity from HBV vaccination. This assay does not differentiate between a vaccine-induced immune response and an immune response induced by infection with HBV. A positive total antihepatitis B core result would indicate that the hepatitis B surface antibody response is due to past HBV infection.    Hepatitis B Surface Antibody Instrument Value >1,000.00 <8.5 m[IU]/mL   HIV Antigen Antibody Combo Cascade   Result Value Ref Range    HIV Antigen Antibody Combo Nonreactive Nonreactive      Comment:       Negative HIV-1/-2 antigen and antibody screening test results usually indicate the absence of HIV-1 and HIV-2 infection. However, such negative results do not rule-out acute HIV infection.  If acute HIV-1 or HIV-2 infection is suspected, detection of HIV-1 or HIV-2 RNA  is recommended.    CBC with platelets and differential   Result Value Ref Range    WBC Count 6.7 4.0 - 11.0 10e3/uL    RBC Count 5.19 4.40 - 5.90 10e6/uL    Hemoglobin 15.5 13.3 - 17.7 g/dL    Hematocrit 45.8 40.0 - 53.0 %    MCV 88 78 - 100 fL    MCH 29.9 26.5 - 33.0 pg    MCHC 33.8 31.5 - 36.5 g/dL    RDW 12.0 10.0 - 15.0 %    Platelet Count 225 150 - 450 10e3/uL    % Neutrophils 69 %    % Lymphocytes 23 %    % Monocytes 7 %    % Eosinophils 1 %    % Basophils 0 %    % Immature Granulocytes 0 %    Absolute Neutrophils 4.6 1.6 - 8.3 10e3/uL    Absolute Lymphocytes 1.5 0.8 - 5.3 10e3/uL    Absolute Monocytes 0.5 0.0 - 1.3 10e3/uL    Absolute Eosinophils 0.1 0.0 - 0.7 10e3/uL    Absolute Basophils 0.0 0.0 - 0.2 10e3/uL    Absolute Immature Granulocytes 0.0 <=0.4 10e3/uL   NEISSERIA GONORRHOEA PCR   Result Value Ref Range    Neisseria gonorrhoeae Negative Negative      Comment:      Negative for N. gonorrhoeae rRNA by transcription mediated amplification. A negative result by transcription mediated amplification does not preclude the presence of C. trachomatis infection because results are dependent on proper and adequate collection, absence of inhibitors and sufficient rRNA to be detected.

## 2024-01-16 LAB
ALBUMIN SERPL BCG-MCNC: 4.9 G/DL (ref 3.5–5.2)
ALP SERPL-CCNC: 80 U/L (ref 40–150)
ALT SERPL W P-5'-P-CCNC: 29 U/L (ref 0–70)
ANION GAP SERPL CALCULATED.3IONS-SCNC: 13 MMOL/L (ref 7–15)
AST SERPL W P-5'-P-CCNC: 29 U/L (ref 0–45)
BILIRUB DIRECT SERPL-MCNC: <0.2 MG/DL (ref 0–0.3)
BILIRUB SERPL-MCNC: 0.3 MG/DL
BUN SERPL-MCNC: 18.2 MG/DL (ref 6–20)
C TRACH DNA SPEC QL NAA+PROBE: NEGATIVE
CALCIUM SERPL-MCNC: 9.6 MG/DL (ref 8.6–10)
CHLORIDE SERPL-SCNC: 101 MMOL/L (ref 98–107)
CHOLEST SERPL-MCNC: 207 MG/DL
CREAT SERPL-MCNC: 0.88 MG/DL (ref 0.67–1.17)
DEPRECATED HCO3 PLAS-SCNC: 27 MMOL/L (ref 22–29)
EGFRCR SERPLBLD CKD-EPI 2021: >90 ML/MIN/1.73M2
FASTING STATUS PATIENT QL REPORTED: NO
GLUCOSE SERPL-MCNC: 103 MG/DL (ref 70–99)
HBV SURFACE AB SERPL IA-ACNC: >1000 M[IU]/ML
HBV SURFACE AB SERPL IA-ACNC: REACTIVE M[IU]/ML
HCV AB SERPL QL IA: NONREACTIVE
HDLC SERPL-MCNC: 50 MG/DL
HIV 1+2 AB+HIV1 P24 AG SERPL QL IA: NONREACTIVE
LDLC SERPL CALC-MCNC: 128 MG/DL
N GONORRHOEA DNA SPEC QL NAA+PROBE: NEGATIVE
NONHDLC SERPL-MCNC: 157 MG/DL
POTASSIUM SERPL-SCNC: 3.5 MMOL/L (ref 3.4–5.3)
PROT SERPL-MCNC: 7.7 G/DL (ref 6.4–8.3)
SODIUM SERPL-SCNC: 141 MMOL/L (ref 135–145)
T PALLIDUM AB SER QL: NONREACTIVE
TRIGL SERPL-MCNC: 143 MG/DL

## 2024-07-01 ENCOUNTER — VIRTUAL VISIT (OUTPATIENT)
Dept: PSYCHOLOGY | Facility: CLINIC | Age: 29
End: 2024-07-01
Payer: COMMERCIAL

## 2024-07-01 DIAGNOSIS — F32.1 MAJOR DEPRESSIVE DISORDER, SINGLE EPISODE, MODERATE (H): Primary | ICD-10-CM

## 2024-07-01 DIAGNOSIS — F41.1 GAD (GENERALIZED ANXIETY DISORDER): ICD-10-CM

## 2024-07-01 PROCEDURE — 90834 PSYTX W PT 45 MINUTES: CPT | Mod: 95 | Performed by: MARRIAGE & FAMILY THERAPIST

## 2024-07-01 ASSESSMENT — PATIENT HEALTH QUESTIONNAIRE - PHQ9
SUM OF ALL RESPONSES TO PHQ QUESTIONS 1-9: 14
SUM OF ALL RESPONSES TO PHQ QUESTIONS 1-9: 14
10. IF YOU CHECKED OFF ANY PROBLEMS, HOW DIFFICULT HAVE THESE PROBLEMS MADE IT FOR YOU TO DO YOUR WORK, TAKE CARE OF THINGS AT HOME, OR GET ALONG WITH OTHER PEOPLE: SOMEWHAT DIFFICULT

## 2024-07-01 ASSESSMENT — ANXIETY QUESTIONNAIRES
3. WORRYING TOO MUCH ABOUT DIFFERENT THINGS: NEARLY EVERY DAY
7. FEELING AFRAID AS IF SOMETHING AWFUL MIGHT HAPPEN: NOT AT ALL
5. BEING SO RESTLESS THAT IT IS HARD TO SIT STILL: MORE THAN HALF THE DAYS
7. FEELING AFRAID AS IF SOMETHING AWFUL MIGHT HAPPEN: NOT AT ALL
6. BECOMING EASILY ANNOYED OR IRRITABLE: MORE THAN HALF THE DAYS
IF YOU CHECKED OFF ANY PROBLEMS ON THIS QUESTIONNAIRE, HOW DIFFICULT HAVE THESE PROBLEMS MADE IT FOR YOU TO DO YOUR WORK, TAKE CARE OF THINGS AT HOME, OR GET ALONG WITH OTHER PEOPLE: SOMEWHAT DIFFICULT
8. IF YOU CHECKED OFF ANY PROBLEMS, HOW DIFFICULT HAVE THESE MADE IT FOR YOU TO DO YOUR WORK, TAKE CARE OF THINGS AT HOME, OR GET ALONG WITH OTHER PEOPLE?: SOMEWHAT DIFFICULT
GAD7 TOTAL SCORE: 15
GAD7 TOTAL SCORE: 15
2. NOT BEING ABLE TO STOP OR CONTROL WORRYING: NEARLY EVERY DAY
GAD7 TOTAL SCORE: 15
4. TROUBLE RELAXING: MORE THAN HALF THE DAYS
1. FEELING NERVOUS, ANXIOUS, OR ON EDGE: NEARLY EVERY DAY

## 2024-07-03 ASSESSMENT — COLUMBIA-SUICIDE SEVERITY RATING SCALE - C-SSRS
1. SINCE LAST CONTACT, HAVE YOU WISHED YOU WERE DEAD OR WISHED YOU COULD GO TO SLEEP AND NOT WAKE UP?: NO
TOTAL  NUMBER OF INTERRUPTED ATTEMPTS SINCE LAST CONTACT: NO
6. HAVE YOU EVER DONE ANYTHING, STARTED TO DO ANYTHING, OR PREPARED TO DO ANYTHING TO END YOUR LIFE?: NO
TOTAL  NUMBER OF ABORTED OR SELF INTERRUPTED ATTEMPTS SINCE LAST CONTACT: NO
ATTEMPT SINCE LAST CONTACT: NO
2. HAVE YOU ACTUALLY HAD ANY THOUGHTS OF KILLING YOURSELF?: NO
SUICIDE, SINCE LAST CONTACT: NO

## 2024-07-03 NOTE — PROGRESS NOTES
[facility]                                     Progress Note    Patient Name: Keon Davis Date: 7/01/24       Service Type: Individual      Session Start Time: 1:32  Session End Time: 2:18     Session Length: 45 min    Session #: 39    Attendees: Client attended alone    Service Modality:  Video Visit:      Provider verified identity through the following two step process.  Patient provided:  Patient is known previously to provider    Telemedicine Visit: The patient's condition can be safely assessed and treated via synchronous audio and visual telemedicine encounter.      Reason for Telemedicine Visit: Patient has requested telehealth visit    Originating Site (Patient Location): Patient's home    Distant Site (Provider Location): Provider Remote Setting- Home Office    Consent:  The patient/guardian has verbally consented to: the potential risks and benefits of telemedicine (video visit) versus in person care; bill my insurance or make self-payment for services provided; and responsibility for payment of non-covered services.     Mode of Communication:  Video Conference via Doximity    Distant Location (Provider):  Off-site    As the provider I attest to compliance with applicable laws and regulations related to telemedicine.     Treatment Plan Last Reviewed: July 1, 2024       DATA  Interactive Complexity: No  Crisis: No             Progress Since Last Session (Related to Symptoms / Goals / Homework):   Symptoms: Worsening .    Homework: Achieved / completed to satisfaction      Episode of Care Goals: Satisfactory progress - MAINTENANCE (Working to maintain change, with risk of relapse); Intervened by continuing to positively reinforce healthy behavior choice      Current / Ongoing Stressors and Concerns:   - seasonal lower mood   - job stress    - relationship conflict     - hypervigilance     Treatment Objective(s) Addressed in This Session:   Client will engage in positive  self-care.     Intervention:  Reviewed emotion regulation, distress tolerance, interpersonal effectiveness, mindfulness and self-care skills and strategies that have been useful to improve symptoms.  Client reports he is having more days with lower mood as he becomes less satisfied with his marital relationship. Discussed strategies and options. Processed emotions in session, validated, supportive counseling. Guidance offered to better utilize client's coping skills.    Assessments completed prior to visit:  The following assessments were completed by patient for this visit:  PHQ9:       5/1/2020     3:33 PM 2/16/2021     9:38 AM 6/28/2021     3:47 PM 10/5/2021     1:10 PM 5/25/2023     3:08 PM 12/20/2023    12:56 PM 7/1/2024    12:48 PM   PHQ-9 SCORE   PHQ-9 Total Score MyChart       14 (Moderate depression)   PHQ-9 Total Score 7 7 9 6 6 7 14     GAD7:       5/1/2020     3:33 PM 2/16/2021     9:38 AM 6/28/2021     3:47 PM 10/5/2021     1:10 PM 5/25/2023     3:08 PM 12/20/2023    12:56 PM 7/1/2024    12:50 PM   SIOBHAN-7 SCORE   Total Score       15 (severe anxiety)   Total Score 9 7 10 9 6 8 15     [place] Suicide Severity Rating Scale (Short Version)      5/1/2020     3:35 PM 2/16/2021     9:39 AM 6/28/2021     3:48 PM 10/5/2021     1:11 PM 5/25/2023     3:08 PM 12/20/2023    12:57 PM 7/3/2024     1:13 PM   [place] Suicide Severity Rating (Short Version)   Over the past 2 weeks have you felt down, depressed, or hopeless? yes yes yes yes      Over the past 2 weeks have you had thoughts of killing yourself? no no no no      Have you ever attempted to kill yourself? no no no no      1. Wish to be Dead (Since Last Contact)     N N N   2. Non-Specific Active Suicidal Thoughts (Since Last Contact)     N N N   Actual Attempt (Since Last Contact)     N N N   Has subject engaged in non-suicidal self-injurious behavior? (Since Last Contact)     N N N   Interrupted Attempts (Since Last Contact)     N N N   Aborted or  Self-Interrupted Attempt (Since Last Contact)     N N N   Preparatory Acts or Behavior (Since Last Contact)     N N N   Suicide (Since Last Contact)     N N N   Calculated C-SSRS Risk Score (Since Last Contact)     No Risk Indicated No Risk Indicated No Risk Indicated          ASSESSMENT: Current Emotional / Mental Status (status of significant symptoms):   Risk status (Self / Other harm or suicidal ideation)   Client denies current fears or concerns for personal safety.   Client denies current or recent suicidal ideation or behaviors.   Client denies current or recent homicidal ideation or behaviors.   Client denies current or recent self injurious behavior or ideation.   Client denies other safety concerns.   A safety and risk management has not been developed at this time.  Client was given the Suicide Prevention National Hotline, Text [place] 267716, the [facility] Crisis Number, or encouraged to Call 911 should there be a change in these risk factors.       Appearance:   Appropriate    Eye Contact:   Good    Psychomotor Behavior: Normal    Attitude:   Cooperative  Buddy   Orientation:   All   Speech    Rate / Production: Normal     Volume:  Normal    Mood:    Anxious  Depressed    Affect:    Appropriate    Thought Content:  Clear    Thought Form:  Coherent  Logical    Insight:    Good      Medication Review:   No current psychiatric medications prescribed.      Medication Compliance:   NA     Changes in Health Issues:   None reported     Chemical Use Review:   Substance Use: Chemical use reviewed, no active concerns identified      Tobacco Use: No current tobacco use.       Diagnosis:   Major Depressive Disorder, Single Episode, Moderate     Generalized Anxiety Disorder       Collateral Reports Completed:   Not Applicable    PLAN: (Client Tasks / Therapist Tasks / Other)  Client will use emotion regulation, distress tolerance, interpersonal effectiveness, mindfulness and self-care skills and strategies that have been  useful to improve symptoms.      Amrit Purcell MA, LMFT                                                       ________________________________________________________________________                                                 Treatment Plan    Client's Name: Keon Davis  YOB: 1995    Date: July 1, 2024       DSM-V Diagnoses: 296.22 - Major Depressive Disorder, Single Episode, Moderate    300.02 - Generalized Anxiety Disorder  ; V71.09 - No Diagnosis  Psychosocial / Contextual Factors: relational conflict    Referral / Collaboration:  Referral to another professional/service is not indicated at this time..    Anticipated number of session or this episode of care: ongoing as needed    Measurable Treatment Goal(s) related to diagnosis / functional impairment(s)   I will know I've met my goal when I have better tools to control my emotions   Goal 1: Client will achieve a significant reduction in PHQ-9 scores.   Objective #A (Client Action)   Client will identify cognitive distortions and negative self-talk contributing to depression.   Status: Continued: July 1, 2024   Intervention(s)   Therapist will teach about identification and strategies to counter negative self-talk and cognitive distortions.  Objective #B (Client Action)   Client will learn and integrate CBT/DBT strategies for more effectively managing emotions and relationships.   Status: Continued: July 1, 2024   Intervention(s)   Therapist will teach emotional regulation skills distress tolerance skills, interpersonal effectiveness skills and mindfulness skills.   Objective #C   Client will engage in positive self-care.  Status: Continued: July 1, 2024   Intervention(s)   Therapist will teach self-care goals:  Maintain balance in schedule (time for self/others, relaxation/activities, leisure/tasks, home/out of the house), assert needs and set limits with others, challenge negative thoughts/use affirming and encouraging self-talk,  engage with support people on regular basis, practice behavior activation behaviors (sleep hygiene, balanced eating, physical activity, medical needs, personal hygiene), acknowledge and accept your feelings.    Client has reviewed and agreed to the above plan.      Amrit Purcell MA, LMFT July 1, 2024

## 2024-07-15 ENCOUNTER — VIRTUAL VISIT (OUTPATIENT)
Dept: PSYCHOLOGY | Facility: CLINIC | Age: 29
End: 2024-07-15
Payer: COMMERCIAL

## 2024-07-15 DIAGNOSIS — F41.1 GAD (GENERALIZED ANXIETY DISORDER): ICD-10-CM

## 2024-07-15 DIAGNOSIS — F32.1 MAJOR DEPRESSIVE DISORDER, SINGLE EPISODE, MODERATE (H): Primary | ICD-10-CM

## 2024-07-15 PROCEDURE — 90834 PSYTX W PT 45 MINUTES: CPT | Mod: 95 | Performed by: MARRIAGE & FAMILY THERAPIST

## 2024-07-29 ENCOUNTER — VIRTUAL VISIT (OUTPATIENT)
Dept: PSYCHOLOGY | Facility: CLINIC | Age: 29
End: 2024-07-29
Payer: COMMERCIAL

## 2024-07-29 DIAGNOSIS — F41.1 GAD (GENERALIZED ANXIETY DISORDER): ICD-10-CM

## 2024-07-29 DIAGNOSIS — F32.1 MAJOR DEPRESSIVE DISORDER, SINGLE EPISODE, MODERATE (H): Primary | ICD-10-CM

## 2024-07-29 PROCEDURE — 90834 PSYTX W PT 45 MINUTES: CPT | Mod: 95 | Performed by: MARRIAGE & FAMILY THERAPIST

## 2024-07-31 NOTE — PROGRESS NOTES
[facility]                                     Progress Note    Patient Name: Keon Davis Date: 7/29/24       Service Type: Individual      Session Start Time: 10:02  Session End Time: 10:46     Session Length: 45 min    Session #: 41    Attendees: Client attended alone    Service Modality:  Video Visit:      Provider verified identity through the following two step process.  Patient provided:  Patient is known previously to provider    Telemedicine Visit: The patient's condition can be safely assessed and treated via synchronous audio and visual telemedicine encounter.      Reason for Telemedicine Visit: Patient has requested telehealth visit    Originating Site (Patient Location): Patient's home    Distant Site (Provider Location): Provider Remote Setting- Home Office    Consent:  The patient/guardian has verbally consented to: the potential risks and benefits of telemedicine (video visit) versus in person care; bill my insurance or make self-payment for services provided; and responsibility for payment of non-covered services.     Mode of Communication:  Video Conference via Doximity    Distant Location (Provider):  Off-site    As the provider I attest to compliance with applicable laws and regulations related to telemedicine.     Treatment Plan Last Reviewed: July 1, 2024       DATA  Interactive Complexity: No  Crisis: No             Progress Since Last Session (Related to Symptoms / Goals / Homework):   Symptoms: No change .    Homework: Achieved / completed to satisfaction      Episode of Care Goals: Satisfactory progress - MAINTENANCE (Working to maintain change, with risk of relapse); Intervened by continuing to positively reinforce healthy behavior choice      Current / Ongoing Stressors and Concerns:   - seasonal lower mood   - job stress    - relationship conflict     - hypervigilance     Treatment Objective(s) Addressed in This Session:   Client will learn and  "integrate CBT/DBT strategies for more effectively managing emotions and relationships.      Intervention:  Reviewed emotion regulation, distress tolerance, interpersonal effectiveness, mindfulness and self-care skills and strategies that have been useful to improve symptoms.  Taught/reviewed/role-played interpersonal effectiveness, communication, negotiation and boundary setting skills. Client reports he and his wife have committed to couple counseling to attempt to resolve differences and communicate more effectively. HE says he is having trouble deciding whether he wants to do the work of keeping the marriage going. Discussed the concept of \"discernment therapy for couples\" who are trying to decide whether to continue to work on the relationship. Processed emotions in session, validated, supportive counseling. Guidance offered to better utilize client's coping skills.    The following assessments were completed by patient for this visit: none  PHQ9:       5/1/2020     3:33 PM 2/16/2021     9:38 AM 6/28/2021     3:47 PM 10/5/2021     1:10 PM 5/25/2023     3:08 PM 12/20/2023    12:56 PM 7/1/2024    12:48 PM   PHQ-9 SCORE   PHQ-9 Total Score MyChart       14 (Moderate depression)   PHQ-9 Total Score 7 7 9 6 6 7 14     GAD7:       5/1/2020     3:33 PM 2/16/2021     9:38 AM 6/28/2021     3:47 PM 10/5/2021     1:10 PM 5/25/2023     3:08 PM 12/20/2023    12:56 PM 7/1/2024    12:50 PM   SIOBHAN-7 SCORE   Total Score       15 (severe anxiety)   Total Score 9 7 10 9 6 8 15     [place] Suicide Severity Rating Scale (Short Version)      5/1/2020     3:35 PM 2/16/2021     9:39 AM 6/28/2021     3:48 PM 10/5/2021     1:11 PM 5/25/2023     3:08 PM 12/20/2023    12:57 PM 7/3/2024     1:13 PM   [place] Suicide Severity Rating (Short Version)   Over the past 2 weeks have you felt down, depressed, or hopeless? yes yes yes yes      Over the past 2 weeks have you had thoughts of killing yourself? no no no no      Have you ever attempted " to kill yourself? no no no no      1. Wish to be Dead (Since Last Contact)     N N N   2. Non-Specific Active Suicidal Thoughts (Since Last Contact)     N N N   Actual Attempt (Since Last Contact)     N N N   Has subject engaged in non-suicidal self-injurious behavior? (Since Last Contact)     N N N   Interrupted Attempts (Since Last Contact)     N N N   Aborted or Self-Interrupted Attempt (Since Last Contact)     N N N   Preparatory Acts or Behavior (Since Last Contact)     N N N   Suicide (Since Last Contact)     N N N   Calculated C-SSRS Risk Score (Since Last Contact)     No Risk Indicated No Risk Indicated No Risk Indicated          AASSESSMENT: Current Emotional / Mental Status (status of significant symptoms):   Risk status (Self / Other harm or suicidal ideation)   Patient denies current fears or concerns for personal safety.   Patient denies current or recent suicidal ideation or behaviors.   Patient denies current or recent homicidal ideation or behaviors.   Patient denies current or recent self injurious behavior or ideation.   Patient denies other safety concerns.   Patient reports there has been no change in risk factors since their last session.     Patient reports there has been no change in protective factors since their last session.     Recommended that patient call 911 or go to the local ED should there be a change in any of these risk factors.     Appearance:   Appropriate    Eye Contact:   Good    Psychomotor Behavior: Normal    Attitude:   Cooperative    Orientation:   All   Speech    Rate / Production: Normal     Volume:  Normal    Mood:    Anxious    Affect:    Appropriate    Thought Content:  Clear    Thought Form:  Coherent  Logical    Insight:    Good      Medication Review:   No current psychiatric medications prescribed.      Medication Compliance:   NA     Changes in Health Issues:   None reported     Chemical Use Review:   Substance Use: Chemical use reviewed, no active concerns  identified      Tobacco Use: No current tobacco use.       Diagnosis:   Major Depressive Disorder, Single Episode, Moderate     Generalized Anxiety Disorder       Collateral Reports Completed:   Not Applicable    PLAN: (Client Tasks / Therapist Tasks / Other)  Client will use emotion regulation, distress tolerance, interpersonal effectiveness, mindfulness and self-care skills and strategies that have been useful to improve symptoms. He will reach out to schedule couple therapy for his relationship and explore discernment therapy.      Amrit Purcell MA, [facility]                                                       ________________________________________________________________________                                                 Treatment Plan    Client's Name: Keon Davis  YOB: 1995    Date: July 1, 2024       DSM-V Diagnoses: 296.22 - Major Depressive Disorder, Single Episode, Moderate    300.02 - Generalized Anxiety Disorder  ; V71.09 - No Diagnosis  Psychosocial / Contextual Factors: relational conflict    Referral / Collaboration:  Referral to another professional/service is not indicated at this time..    Anticipated number of session or this episode of care: ongoing as needed    Measurable Treatment Goal(s) related to diagnosis / functional impairment(s)   I will know I've met my goal when I have better tools to control my emotions   Goal 1: Client will achieve a significant reduction in PHQ-9 scores.   Objective #A (Client Action)   Client will identify cognitive distortions and negative self-talk contributing to depression.   Status: Continued: July 1, 2024   Intervention(s)   Therapist will teach about identification and strategies to counter negative self-talk and cognitive distortions.  Objective #B (Client Action)   Client will learn and integrate CBT/DBT strategies for more effectively managing emotions and relationships.   Status: Continued: July 1, 2024   Intervention(s)    Therapist will teach emotional regulation skills distress tolerance skills, interpersonal effectiveness skills and mindfulness skills.   Objective #C   Client will engage in positive self-care.  Status: Continued: July 1, 2024   Intervention(s)   Therapist will teach self-care goals:  Maintain balance in schedule (time for self/others, relaxation/activities, leisure/tasks, home/out of the house), assert needs and set limits with others, challenge negative thoughts/use affirming and encouraging self-talk, engage with support people on regular basis, practice behavior activation behaviors (sleep hygiene, balanced eating, physical activity, medical needs, personal hygiene), acknowledge and accept your feelings.    Client has reviewed and agreed to the above plan.      Amrit Purcell MA, LMFT July 1, 2024

## 2024-08-09 ASSESSMENT — ANXIETY QUESTIONNAIRES
6. BECOMING EASILY ANNOYED OR IRRITABLE: SEVERAL DAYS
4. TROUBLE RELAXING: MORE THAN HALF THE DAYS
3. WORRYING TOO MUCH ABOUT DIFFERENT THINGS: MORE THAN HALF THE DAYS
8. IF YOU CHECKED OFF ANY PROBLEMS, HOW DIFFICULT HAVE THESE MADE IT FOR YOU TO DO YOUR WORK, TAKE CARE OF THINGS AT HOME, OR GET ALONG WITH OTHER PEOPLE?: SOMEWHAT DIFFICULT
2. NOT BEING ABLE TO STOP OR CONTROL WORRYING: MORE THAN HALF THE DAYS
7. FEELING AFRAID AS IF SOMETHING AWFUL MIGHT HAPPEN: MORE THAN HALF THE DAYS
GAD7 TOTAL SCORE: 14
5. BEING SO RESTLESS THAT IT IS HARD TO SIT STILL: MORE THAN HALF THE DAYS
1. FEELING NERVOUS, ANXIOUS, OR ON EDGE: NEARLY EVERY DAY
GAD7 TOTAL SCORE: 14
IF YOU CHECKED OFF ANY PROBLEMS ON THIS QUESTIONNAIRE, HOW DIFFICULT HAVE THESE PROBLEMS MADE IT FOR YOU TO DO YOUR WORK, TAKE CARE OF THINGS AT HOME, OR GET ALONG WITH OTHER PEOPLE: SOMEWHAT DIFFICULT
GAD7 TOTAL SCORE: 14
7. FEELING AFRAID AS IF SOMETHING AWFUL MIGHT HAPPEN: MORE THAN HALF THE DAYS

## 2024-08-12 ENCOUNTER — VIRTUAL VISIT (OUTPATIENT)
Dept: PSYCHOLOGY | Facility: CLINIC | Age: 29
End: 2024-08-12
Payer: COMMERCIAL

## 2024-08-12 DIAGNOSIS — F41.1 GAD (GENERALIZED ANXIETY DISORDER): ICD-10-CM

## 2024-08-12 DIAGNOSIS — F32.1 MAJOR DEPRESSIVE DISORDER, SINGLE EPISODE, MODERATE (H): Primary | ICD-10-CM

## 2024-08-12 PROCEDURE — 90834 PSYTX W PT 45 MINUTES: CPT | Mod: 95 | Performed by: MARRIAGE & FAMILY THERAPIST

## 2024-08-14 NOTE — PROGRESS NOTES
[facility]                                     Progress Note    Patient Name: Keon Davis Date: 8/12/24       Service Type: Individual      Session Start Time: 2:31  Session End Time: 3:14     Session Length: 38 - 52 minutes    Session #: 42    Attendees: Client attended alone    Service Modality:  Video Visit:      Provider verified identity through the following two step process.  Patient provided:  Patient is known previously to provider    Telemedicine Visit: The patient's condition can be safely assessed and treated via synchronous audio and visual telemedicine encounter.      Reason for Telemedicine Visit: Patient has requested telehealth visit    Originating Site (Patient Location): Patient's home    Distant Site (Provider Location): Provider Remote Setting- Home Office    Consent:  The patient/guardian has verbally consented to: the potential risks and benefits of telemedicine (video visit) versus in person care; bill my insurance or make self-payment for services provided; and responsibility for payment of non-covered services.     Mode of Communication:  Video Conference via Doximity    Distant Location (Provider):  Off-site    As the provider I attest to compliance with applicable laws and regulations related to telemedicine.     Treatment Plan Last Reviewed: July 1, 2024       DATA  Interactive Complexity: No  Crisis: No             Progress Since Last Session (Related to Symptoms / Goals / Homework):   Symptoms: No change .    Homework: Achieved / completed to satisfaction      Episode of Care Goals: Satisfactory progress - MAINTENANCE (Working to maintain change, with risk of relapse); Intervened by continuing to positively reinforce healthy behavior choice      Current / Ongoing Stressors and Concerns:   - seasonal lower mood   - job stress    - relationship conflict     - hypervigilance     Treatment Objective(s) Addressed in This Session:   Client will learn and  integrate CBT/DBT strategies for more effectively managing emotions and relationships.      Intervention:  Reviewed emotion regulation, distress tolerance, interpersonal effectiveness, mindfulness and self-care skills and strategies that have been useful to improve symptoms.  Taught/reviewed mindfulness skills and strategies. Client reports physical symptoms of the stress he is experiencing in his marriage. He says he has mostly decided to part, but as the process, including some couple counseling, unfolds he is very anxious. Processed emotions in session, validated, supportive counseling. Guidance offered to better utilize client's coping skills.    The following assessments were completed by patient for this visit: none  PHQ9:       5/1/2020     3:33 PM 2/16/2021     9:38 AM 6/28/2021     3:47 PM 10/5/2021     1:10 PM 5/25/2023     3:08 PM 12/20/2023    12:56 PM 7/1/2024    12:48 PM   PHQ-9 SCORE   PHQ-9 Total Score MyChart       14 (Moderate depression)   PHQ-9 Total Score 7 7 9 6 6 7 14     GAD7:       2/16/2021     9:38 AM 6/28/2021     3:47 PM 10/5/2021     1:10 PM 5/25/2023     3:08 PM 12/20/2023    12:56 PM 7/1/2024    12:50 PM 8/9/2024     8:27 PM   SIOBHAN-7 SCORE   Total Score      15 (severe anxiety) 14 (moderate anxiety)   Total Score 7 10 9 6 8 15 14     [place] Suicide Severity Rating Scale (Short Version)      5/1/2020     3:35 PM 2/16/2021     9:39 AM 6/28/2021     3:48 PM 10/5/2021     1:11 PM 5/25/2023     3:08 PM 12/20/2023    12:57 PM 7/3/2024     1:13 PM   [place] Suicide Severity Rating (Short Version)   Over the past 2 weeks have you felt down, depressed, or hopeless? yes yes yes yes      Over the past 2 weeks have you had thoughts of killing yourself? no no no no      Have you ever attempted to kill yourself? no no no no      1. Wish to be Dead (Since Last Contact)     N N N   2. Non-Specific Active Suicidal Thoughts (Since Last Contact)     N N N   Actual Attempt (Since Last Contact)     N N  N   Has subject engaged in non-suicidal self-injurious behavior? (Since Last Contact)     N N N   Interrupted Attempts (Since Last Contact)     N N N   Aborted or Self-Interrupted Attempt (Since Last Contact)     N N N   Preparatory Acts or Behavior (Since Last Contact)     N N N   Suicide (Since Last Contact)     N N N   Calculated C-SSRS Risk Score (Since Last Contact)     No Risk Indicated No Risk Indicated No Risk Indicated          ASSESSMENT: Current Emotional / Mental Status (status of significant symptoms):   Risk status (Self / Other harm or suicidal ideation)   Patient denies current fears or concerns for personal safety.   Patient denies current or recent suicidal ideation or behaviors.   Patient denies current or recent homicidal ideation or behaviors.   Patient denies current or recent self injurious behavior or ideation.   Patient denies other safety concerns.   Patient reports there has been no change in risk factors since their last session.     Patient reports there has been no change in protective factors since their last session.     Recommended that patient call 911 or go to the local ED should there be a change in any of these risk factors.     Appearance:   Appropriate    Eye Contact:   Good    Psychomotor Behavior: Normal    Attitude:   Interested   Orientation:   All   Speech    Rate / Production: Normal     Volume:  Normal    Mood:    Anxious    Affect:    Appropriate    Thought Content:  Clear    Thought Form:  Coherent  Logical    Insight:    Good      Medication Review:   No current psychiatric medications prescribed.      Medication Compliance:   NA     Changes in Health Issues:   None reported     Chemical Use Review:   Substance Use: Chemical use reviewed, no active concerns identified      Tobacco Use: No current tobacco use.       Diagnosis:   Major Depressive Disorder, Single Episode, Moderate     Generalized Anxiety Disorder       Collateral Reports Completed:   Not  Applicable    PLAN: (Client Tasks / Therapist Tasks / Other)  Client will use emotion regulation, distress tolerance, interpersonal effectiveness, mindfulness and self-care skills and strategies that have been useful to improve symptoms. He will continue relationshi discernment therapy.      Amrit Purcell MA, LMFT                                                       ________________________________________________________________________                                                 Treatment Plan    Client's Name: Keon Davis  YOB: 1995    Date: July 1, 2024       DSM-V Diagnoses: 296.22 - Major Depressive Disorder, Single Episode, Moderate    300.02 - Generalized Anxiety Disorder  ; V71.09 - No Diagnosis  Psychosocial / Contextual Factors: relational conflict    Referral / Collaboration:  Referral to another professional/service is not indicated at this time..    Anticipated number of session or this episode of care: ongoing as needed    Measurable Treatment Goal(s) related to diagnosis / functional impairment(s)   I will know I've met my goal when I have better tools to control my emotions   Goal 1: Client will achieve a significant reduction in PHQ-9 scores.   Objective #A (Client Action)   Client will identify cognitive distortions and negative self-talk contributing to depression.   Status: Continued: July 1, 2024   Intervention(s)   Therapist will teach about identification and strategies to counter negative self-talk and cognitive distortions.  Objective #B (Client Action)   Client will learn and integrate CBT/DBT strategies for more effectively managing emotions and relationships.   Status: Continued: July 1, 2024   Intervention(s)   Therapist will teach emotional regulation skills distress tolerance skills, interpersonal effectiveness skills and mindfulness skills.   Objective #C   Client will engage in positive self-care.  Status: Continued: July 1, 2024   Intervention(s)    Therapist will teach self-care goals:  Maintain balance in schedule (time for self/others, relaxation/activities, leisure/tasks, home/out of the house), assert needs and set limits with others, challenge negative thoughts/use affirming and encouraging self-talk, engage with support people on regular basis, practice behavior activation behaviors (sleep hygiene, balanced eating, physical activity, medical needs, personal hygiene), acknowledge and accept your feelings.    Client has reviewed and agreed to the above plan.      Amrit Purcell MA, LMFT July 1, 2024

## 2024-08-26 ENCOUNTER — VIRTUAL VISIT (OUTPATIENT)
Dept: PSYCHOLOGY | Facility: CLINIC | Age: 29
End: 2024-08-26
Payer: COMMERCIAL

## 2024-08-26 DIAGNOSIS — F41.1 GAD (GENERALIZED ANXIETY DISORDER): ICD-10-CM

## 2024-08-26 DIAGNOSIS — F32.1 MAJOR DEPRESSIVE DISORDER, SINGLE EPISODE, MODERATE (H): Primary | ICD-10-CM

## 2024-08-26 PROCEDURE — 90834 PSYTX W PT 45 MINUTES: CPT | Mod: 95 | Performed by: MARRIAGE & FAMILY THERAPIST

## 2024-08-26 ASSESSMENT — ANXIETY QUESTIONNAIRES
2. NOT BEING ABLE TO STOP OR CONTROL WORRYING: SEVERAL DAYS
4. TROUBLE RELAXING: SEVERAL DAYS
5. BEING SO RESTLESS THAT IT IS HARD TO SIT STILL: MORE THAN HALF THE DAYS
GAD7 TOTAL SCORE: 9
1. FEELING NERVOUS, ANXIOUS, OR ON EDGE: MORE THAN HALF THE DAYS
IF YOU CHECKED OFF ANY PROBLEMS ON THIS QUESTIONNAIRE, HOW DIFFICULT HAVE THESE PROBLEMS MADE IT FOR YOU TO DO YOUR WORK, TAKE CARE OF THINGS AT HOME, OR GET ALONG WITH OTHER PEOPLE: SOMEWHAT DIFFICULT
3. WORRYING TOO MUCH ABOUT DIFFERENT THINGS: SEVERAL DAYS
6. BECOMING EASILY ANNOYED OR IRRITABLE: SEVERAL DAYS
7. FEELING AFRAID AS IF SOMETHING AWFUL MIGHT HAPPEN: SEVERAL DAYS
GAD7 TOTAL SCORE: 9
8. IF YOU CHECKED OFF ANY PROBLEMS, HOW DIFFICULT HAVE THESE MADE IT FOR YOU TO DO YOUR WORK, TAKE CARE OF THINGS AT HOME, OR GET ALONG WITH OTHER PEOPLE?: SOMEWHAT DIFFICULT

## 2024-08-29 NOTE — PROGRESS NOTES
[facility]                                     Progress Note    Patient Name: Keon Davis Date: 8/26/24       Service Type: Individual      Session Start Time: 2:34  Session End Time: 3:21     Session Length: 38 - 52 minutes    Session #: 43    Attendees: Client attended alone    Service Modality:  Video Visit:      Provider verified identity through the following two step process.  Patient provided:  Patient is known previously to provider    Telemedicine Visit: The patient's condition can be safely assessed and treated via synchronous audio and visual telemedicine encounter.      Reason for Telemedicine Visit: Patient has requested telehealth visit    Originating Site (Patient Location): Patient's home    Distant Site (Provider Location): Provider Remote Setting- Home Office    Consent:  The patient/guardian has verbally consented to: the potential risks and benefits of telemedicine (video visit) versus in person care; bill my insurance or make self-payment for services provided; and responsibility for payment of non-covered services.     Mode of Communication:  Video Conference via Doximity    Distant Location (Provider):  Off-site    As the provider I attest to compliance with applicable laws and regulations related to telemedicine.     Treatment Plan Last Reviewed: July 1, 2024       DATA  Interactive Complexity: No  Crisis: No             Progress Since Last Session (Related to Symptoms / Goals / Homework):   Symptoms: No change .    Homework: Achieved / completed to satisfaction      Episode of Care Goals: Satisfactory progress - ACTION (Actively working towards change); Intervened by reinforcing change plan / affirming steps taken     Current / Ongoing Stressors and Concerns:   - seasonal lower mood   - job stress    - relationship conflict     - hypervigilance     Treatment Objective(s) Addressed in This Session:   Client will learn and integrate CBT/DBT strategies for  more effectively managing emotions and relationships.      Intervention:  Taught/reviewed emotion regulation skills and strategies: build MASTERy for daily use. Client reports he has decided to forgo couple counseling, as he has decided to end his marriage. He says his wife is not going to be taken totally by surprise, but he is very anxious about telling her of his final decision. Processed emotions in session, validated, supportive counseling. Guidance offered to better utilize client's coping skills.    The following assessments were completed by patient for this visit:   PHQ9:       5/1/2020     3:33 PM 2/16/2021     9:38 AM 6/28/2021     3:47 PM 10/5/2021     1:10 PM 5/25/2023     3:08 PM 12/20/2023    12:56 PM 7/1/2024    12:48 PM   PHQ-9 SCORE   PHQ-9 Total Score MyChart       14 (Moderate depression)   PHQ-9 Total Score 7 7 9 6 6 7 14     GAD7:       6/28/2021     3:47 PM 10/5/2021     1:10 PM 5/25/2023     3:08 PM 12/20/2023    12:56 PM 7/1/2024    12:50 PM 8/9/2024     8:27 PM 8/26/2024     8:58 AM   SIOBHAN-7 SCORE   Total Score     15 (severe anxiety) 14 (moderate anxiety) 9 (mild anxiety)   Total Score 10 9 6 8 15 14 9     [place] Suicide Severity Rating Scale (Short Version)      5/1/2020     3:35 PM 2/16/2021     9:39 AM 6/28/2021     3:48 PM 10/5/2021     1:11 PM 5/25/2023     3:08 PM 12/20/2023    12:57 PM 7/3/2024     1:13 PM   [place] Suicide Severity Rating (Short Version)   Over the past 2 weeks have you felt down, depressed, or hopeless? yes yes yes yes      Over the past 2 weeks have you had thoughts of killing yourself? no no no no      Have you ever attempted to kill yourself? no no no no      1. Wish to be Dead (Since Last Contact)     N N N   2. Non-Specific Active Suicidal Thoughts (Since Last Contact)     N N N   Actual Attempt (Since Last Contact)     N N N   Has subject engaged in non-suicidal self-injurious behavior? (Since Last Contact)     N N N   Interrupted Attempts (Since Last  Contact)     N N N   Aborted or Self-Interrupted Attempt (Since Last Contact)     N N N   Preparatory Acts or Behavior (Since Last Contact)     N N N   Suicide (Since Last Contact)     N N N   Calculated C-SSRS Risk Score (Since Last Contact)     No Risk Indicated No Risk Indicated No Risk Indicated          ASSESSMENT: Current Emotional / Mental Status (status of significant symptoms):   Risk status (Self / Other harm or suicidal ideation)   Patient denies current fears or concerns for personal safety.   Patient denies current or recent suicidal ideation or behaviors.   Patient denies current or recent homicidal ideation or behaviors.   Patient denies current or recent self injurious behavior or ideation.   Patient denies other safety concerns.   Patient reports there has been no change in risk factors since their last session.     Patient reports there has been no change in protective factors since their last session.     Recommended that patient call 911 or go to the local ED should there be a change in any of these risk factors.     Appearance:   Appropriate    Eye Contact:   Good    Psychomotor Behavior: Normal    Attitude:   Cooperative  Attentive   Orientation:   All   Speech    Rate / Production: Normal     Volume:  Normal    Mood:    Anxious  Grieving   Affect:    Appropriate    Thought Content:  Clear    Thought Form:  Coherent  Logical    Insight:    Good      Medication Review:   No current psychiatric medications prescribed.      Medication Compliance:   NA     Changes in Health Issues:   None reported     Chemical Use Review:   Substance Use: Chemical use reviewed, no active concerns identified      Tobacco Use: No current tobacco use.       Diagnosis:   Major Depressive Disorder, Single Episode, Moderate     Generalized Anxiety Disorder       Collateral Reports Completed:   Not Applicable    PLAN: (Client Tasks / Therapist Tasks / Other)  Client will use emotion regulation skills and strategies: build  MASTERy daily.      Amrit Purcell MA, LMFT                                                       ________________________________________________________________________                                                 Treatment Plan    Client's Name: Keon Davis  YOB: 1995    Date: July 1, 2024       DSM-V Diagnoses: 296.22 - Major Depressive Disorder, Single Episode, Moderate    300.02 - Generalized Anxiety Disorder  ; V71.09 - No Diagnosis  Psychosocial / Contextual Factors: relational conflict    Referral / Collaboration:  Referral to another professional/service is not indicated at this time..    Anticipated number of session or this episode of care: ongoing as needed    Measurable Treatment Goal(s) related to diagnosis / functional impairment(s)   I will know I've met my goal when I have better tools to control my emotions   Goal 1: Client will achieve a significant reduction in PHQ-9 scores.   Objective #A (Client Action)   Client will identify cognitive distortions and negative self-talk contributing to depression.   Status: Continued: July 1, 2024   Intervention(s)   Therapist will teach about identification and strategies to counter negative self-talk and cognitive distortions.  Objective #B (Client Action)   Client will learn and integrate CBT/DBT strategies for more effectively managing emotions and relationships.   Status: Continued: July 1, 2024   Intervention(s)   Therapist will teach emotional regulation skills distress tolerance skills, interpersonal effectiveness skills and mindfulness skills.   Objective #C   Client will engage in positive self-care.  Status: Continued: July 1, 2024   Intervention(s)   Therapist will teach self-care goals:  Maintain balance in schedule (time for self/others, relaxation/activities, leisure/tasks, home/out of the house), assert needs and set limits with others, challenge negative thoughts/use affirming and encouraging self-talk, engage with  support people on regular basis, practice behavior activation behaviors (sleep hygiene, balanced eating, physical activity, medical needs, personal hygiene), acknowledge and accept your feelings.    Client has reviewed and agreed to the above plan.      Amrit Purcell MA, LMFT July 1, 2024

## 2024-09-09 ENCOUNTER — VIRTUAL VISIT (OUTPATIENT)
Dept: PSYCHOLOGY | Facility: CLINIC | Age: 29
End: 2024-09-09
Payer: COMMERCIAL

## 2024-09-09 DIAGNOSIS — F41.1 GAD (GENERALIZED ANXIETY DISORDER): ICD-10-CM

## 2024-09-09 DIAGNOSIS — F32.1 MAJOR DEPRESSIVE DISORDER, SINGLE EPISODE, MODERATE (H): Primary | ICD-10-CM

## 2024-09-09 PROCEDURE — 90834 PSYTX W PT 45 MINUTES: CPT | Mod: 95 | Performed by: MARRIAGE & FAMILY THERAPIST

## 2024-09-12 NOTE — PROGRESS NOTES
[facility]                                     Progress Note    Patient Name: Keon Davis Date: 9/09/24       Service Type: Individual      Session Start Time: 2:30  Session End Time: 3:16     Session Length: 38 - 52 minutes    Session #: 44    Attendees: Client attended alone    Service Modality:  Video Visit:      Provider verified identity through the following two step process.  Patient provided:  Patient is known previously to provider    Telemedicine Visit: The patient's condition can be safely assessed and treated via synchronous audio and visual telemedicine encounter.      Reason for Telemedicine Visit: Patient has requested telehealth visit    Originating Site (Patient Location): Patient's home    Distant Site (Provider Location): Provider Remote Setting- Home Office    Consent:  The patient/guardian has verbally consented to: the potential risks and benefits of telemedicine (video visit) versus in person care; bill my insurance or make self-payment for services provided; and responsibility for payment of non-covered services.     Mode of Communication:  Video Conference via Doximity    Distant Location (Provider):  Off-site    As the provider I attest to compliance with applicable laws and regulations related to telemedicine.     Treatment Plan Last Reviewed: July 1, 2024       DATA  Interactive Complexity: No  Crisis: No             Progress Since Last Session (Related to Symptoms / Goals / Homework):   Symptoms: No change .    Homework: Achieved / completed to satisfaction      Episode of Care Goals: Satisfactory progress - ACTION (Actively working towards change); Intervened by reinforcing change plan / affirming steps taken     Current / Ongoing Stressors and Concerns:   - seasonal lower mood   - job stress    - relationship conflict     - hypervigilance     Treatment Objective(s) Addressed in This Session:   Client will increase understanding of ambiguous  loss/grief and strategies to cope with a loss.      Intervention:  Taught/reviewed grief process and coping strategies, including strong self-care behaviors. Client reports he told his wife of his intention to seek a divorce. She says that despite them having discussion previously, she said he didn't give her enough time to change. HE says he has been staying at his father's house since then. He says they have a medication consultation in two weeks. He says he is not feeling guilty, but is grieving. Processed emotions in session, validated, supportive counseling. Guidance offered to better utilize client's coping skills.    The following assessments were completed by patient for this visit:   PHQ9:       5/1/2020     3:33 PM 2/16/2021     9:38 AM 6/28/2021     3:47 PM 10/5/2021     1:10 PM 5/25/2023     3:08 PM 12/20/2023    12:56 PM 7/1/2024    12:48 PM   PHQ-9 SCORE   PHQ-9 Total Score MyChart       14 (Moderate depression)   PHQ-9 Total Score 7 7 9 6 6 7 14     GAD7:       6/28/2021     3:47 PM 10/5/2021     1:10 PM 5/25/2023     3:08 PM 12/20/2023    12:56 PM 7/1/2024    12:50 PM 8/9/2024     8:27 PM 8/26/2024     8:58 AM   SIOBHAN-7 SCORE   Total Score     15 (severe anxiety) 14 (moderate anxiety) 9 (mild anxiety)   Total Score 10 9 6 8 15 14 9     [place] Suicide Severity Rating Scale (Short Version)      5/1/2020     3:35 PM 2/16/2021     9:39 AM 6/28/2021     3:48 PM 10/5/2021     1:11 PM 5/25/2023     3:08 PM 12/20/2023    12:57 PM 7/3/2024     1:13 PM   [place] Suicide Severity Rating (Short Version)   Over the past 2 weeks have you felt down, depressed, or hopeless? yes yes yes yes      Over the past 2 weeks have you had thoughts of killing yourself? no no no no      Have you ever attempted to kill yourself? no no no no      1. Wish to be Dead (Since Last Contact)     N N N   2. Non-Specific Active Suicidal Thoughts (Since Last Contact)     N N N   Actual Attempt (Since Last Contact)     N N N   Has subject  engaged in non-suicidal self-injurious behavior? (Since Last Contact)     N N N   Interrupted Attempts (Since Last Contact)     N N N   Aborted or Self-Interrupted Attempt (Since Last Contact)     N N N   Preparatory Acts or Behavior (Since Last Contact)     N N N   Suicide (Since Last Contact)     N N N   Calculated C-SSRS Risk Score (Since Last Contact)     No Risk Indicated No Risk Indicated No Risk Indicated          ASSESSMENT: Current Emotional / Mental Status (status of significant symptoms):   Risk status (Self / Other harm or suicidal ideation)   Patient denies current fears or concerns for personal safety.   Patient denies current or recent suicidal ideation or behaviors.   Patient denies current or recent homicidal ideation or behaviors.   Patient denies current or recent self injurious behavior or ideation.   Patient denies other safety concerns.   Patient reports there has been no change in risk factors since their last session.     Patient reports there has been no change in protective factors since their last session.     Recommended that patient call 911 or go to the local ED should there be a change in any of these risk factors.     Appearance:   Appropriate    Eye Contact:   Good    Psychomotor Behavior: Normal    Attitude:   Interested Buddy   Orientation:   All   Speech    Rate / Production: Normal     Volume:  Normal    Mood:    Grieving   Affect:    Appropriate    Thought Content:  Clear    Thought Form:  Coherent  Logical    Insight:    Good      Medication Review:   No current psychiatric medications prescribed.      Medication Compliance:   NA     Changes in Health Issues:   None reported     Chemical Use Review:   Substance Use: Chemical use reviewed, no active concerns identified      Tobacco Use: No current tobacco use.       Diagnosis:   Major Depressive Disorder, Single Episode, Moderate     Generalized Anxiety Disorder       Collateral Reports Completed:   Not Applicable    PLAN:  (Client Tasks / Therapist Tasks / Other)  Client will use grief coping strategies, including strong self-care behaviors.      Amrit Purcell MA, LMFT                                                       ________________________________________________________________________                                                 Treatment Plan    Client's Name: Keon Davis  YOB: 1995    Date: July 1, 2024       DSM-V Diagnoses: 296.22 - Major Depressive Disorder, Single Episode, Moderate    300.02 - Generalized Anxiety Disorder  ; V71.09 - No Diagnosis  Psychosocial / Contextual Factors: relational conflict    Referral / Collaboration:  Referral to another professional/service is not indicated at this time..    Anticipated number of session or this episode of care: ongoing as needed    Measurable Treatment Goal(s) related to diagnosis / functional impairment(s)   I will know I've met my goal when I have better tools to control my emotions   Goal 1: Client will achieve a significant reduction in PHQ-9 scores.   Objective #A (Client Action)   Client will identify cognitive distortions and negative self-talk contributing to depression.   Status: Continued: July 1, 2024   Intervention(s)   Therapist will teach about identification and strategies to counter negative self-talk and cognitive distortions.  Objective #B (Client Action)   Client will learn and integrate CBT/DBT strategies for more effectively managing emotions and relationships.   Status: Continued: July 1, 2024   Intervention(s)   Therapist will teach emotional regulation skills distress tolerance skills, interpersonal effectiveness skills and mindfulness skills.   Objective #C   Client will engage in positive self-care.  Status: Continued: July 1, 2024   Intervention(s)   Therapist will teach self-care goals:  Maintain balance in schedule (time for self/others, relaxation/activities, leisure/tasks, home/out of the house), assert needs and  set limits with others, challenge negative thoughts/use affirming and encouraging self-talk, engage with support people on regular basis, practice behavior activation behaviors (sleep hygiene, balanced eating, physical activity, medical needs, personal hygiene), acknowledge and accept your feelings.    Client has reviewed and agreed to the above plan.      Amrit Purcell MA, LMFT July 1, 2024

## 2024-09-30 ENCOUNTER — VIRTUAL VISIT (OUTPATIENT)
Dept: PSYCHOLOGY | Facility: CLINIC | Age: 29
End: 2024-09-30
Payer: COMMERCIAL

## 2024-09-30 DIAGNOSIS — F41.1 GAD (GENERALIZED ANXIETY DISORDER): ICD-10-CM

## 2024-09-30 DIAGNOSIS — F32.1 MAJOR DEPRESSIVE DISORDER, SINGLE EPISODE, MODERATE (H): Primary | ICD-10-CM

## 2024-09-30 PROCEDURE — 90834 PSYTX W PT 45 MINUTES: CPT | Mod: 95 | Performed by: MARRIAGE & FAMILY THERAPIST

## 2024-10-03 NOTE — PROGRESS NOTES
[facility]                                     Progress Note    Patient Name: Keon Davis Date: 9/30/24       Service Type: Individual      Session Start Time: 3:30  Session End Time: 4:18     Session Length: 38 - 52 minutes    Session #: 45    Attendees: Client attended alone    Service Modality:  Video Visit:      Provider verified identity through the following two step process.  Patient provided:  Patient is known previously to provider    Telemedicine Visit: The patient's condition can be safely assessed and treated via synchronous audio and visual telemedicine encounter.      Reason for Telemedicine Visit: Patient has requested telehealth visit    Originating Site (Patient Location): Patient's home    Distant Site (Provider Location): Provider Remote Setting- Home Office    Consent:  The patient/guardian has verbally consented to: the potential risks and benefits of telemedicine (video visit) versus in person care; bill my insurance or make self-payment for services provided; and responsibility for payment of non-covered services.     Mode of Communication:  Video Conference via Doximity    Distant Location (Provider):  Off-site    As the provider I attest to compliance with applicable laws and regulations related to telemedicine.     Treatment Plan Last Reviewed: July 1, 2024       DATA  Interactive Complexity: No  Crisis: No             Progress Since Last Session (Related to Symptoms / Goals / Homework):   Symptoms: Improving .    Homework: Achieved / completed to satisfaction      Episode of Care Goals: Satisfactory progress - ACTION (Actively working towards change); Intervened by reinforcing change plan / affirming steps taken     Current / Ongoing Stressors and Concerns:   - seasonal lower mood   - job stress    - relationship conflict     - hypervigilance     Treatment Objective(s) Addressed in This Session:   Client will engage in positive self-care  behaviors.     Intervention:  Reviewed/revised self-care goals, performance and behavioral activation strategies to overcome obstacles. Client reports he feels relieved and unburdened by the status of his marriage and their mutual agreement to seek a divorce. HE says they had a first mediation consult and will engage in the mediation process on 10/23. He says he is living with his father and step-mother, who are supportive. HE says he has begun a relationship with a woman he knows from work and  is enjoying getting to know her. Processed emotions in session, validated, supportive counseling. Guidance offered to better utilize client's coping skills.    The following assessments were completed by patient for this visit:   PHQ9:       5/1/2020     3:33 PM 2/16/2021     9:38 AM 6/28/2021     3:47 PM 10/5/2021     1:10 PM 5/25/2023     3:08 PM 12/20/2023    12:56 PM 7/1/2024    12:48 PM   PHQ-9 SCORE   PHQ-9 Total Score MyChart       14 (Moderate depression)   PHQ-9 Total Score 7 7 9 6 6 7 14     GAD7:       6/28/2021     3:47 PM 10/5/2021     1:10 PM 5/25/2023     3:08 PM 12/20/2023    12:56 PM 7/1/2024    12:50 PM 8/9/2024     8:27 PM 8/26/2024     8:58 AM   SIOBHAN-7 SCORE   Total Score     15 (severe anxiety) 14 (moderate anxiety) 9 (mild anxiety)   Total Score 10 9 6 8 15 14 9     [place] Suicide Severity Rating Scale (Short Version)      5/1/2020     3:35 PM 2/16/2021     9:39 AM 6/28/2021     3:48 PM 10/5/2021     1:11 PM 5/25/2023     3:08 PM 12/20/2023    12:57 PM 7/3/2024     1:13 PM   [place] Suicide Severity Rating (Short Version)   Over the past 2 weeks have you felt down, depressed, or hopeless? yes yes yes yes      Over the past 2 weeks have you had thoughts of killing yourself? no no no no      Have you ever attempted to kill yourself? no no no no      1. Wish to be Dead (Since Last Contact)     N N N   2. Non-Specific Active Suicidal Thoughts (Since Last Contact)     N N N   Actual Attempt (Since Last  Contact)     N N N   Has subject engaged in non-suicidal self-injurious behavior? (Since Last Contact)     N N N   Interrupted Attempts (Since Last Contact)     N N N   Aborted or Self-Interrupted Attempt (Since Last Contact)     N N N   Preparatory Acts or Behavior (Since Last Contact)     N N N   Suicide (Since Last Contact)     N N N   Calculated C-SSRS Risk Score (Since Last Contact)     No Risk Indicated No Risk Indicated No Risk Indicated          ASSESSMENT: Current Emotional / Mental Status (status of significant symptoms):   Risk status (Self / Other harm or suicidal ideation)   Patient denies current fears or concerns for personal safety.   Patient denies current or recent suicidal ideation or behaviors.   Patient denies current or recent homicidal ideation or behaviors.   Patient denies current or recent self injurious behavior or ideation.   Patient denies other safety concerns.   Patient reports there has been no change in risk factors since their last session.     Patient reports there has been no change in protective factors since their last session.     Recommended that patient call 911 or go to the local ED should there be a change in any of these risk factors.     Appearance:   Appropriate    Eye Contact:   Good    Psychomotor Behavior: Normal    Attitude:   Cooperative  Friendly   Orientation:   All   Speech    Rate / Production: Normal     Volume:  Normal    Mood:    Normal   Affect:    Appropriate    Thought Content:  Clear    Thought Form:  Coherent  Logical    Insight:    Good      Medication Review:   No current psychiatric medications prescribed.      Medication Compliance:   NA     Changes in Health Issues:   None reported     Chemical Use Review:   Substance Use: Chemical use reviewed, no active concerns identified      Tobacco Use: No current tobacco use.       Diagnosis:   Major Depressive Disorder, Single Episode, Moderate     Generalized Anxiety Disorder       Collateral Reports  Completed:   Not Applicable    PLAN: (Client Tasks / Therapist Tasks / Other)  Client will achieve 80% of self-care goals.      Amrit Purcell MA, LMFT                                                       ________________________________________________________________________                                                 Treatment Plan    Client's Name: Keon Davis  YOB: 1995    Date: July 1, 2024       DSM-V Diagnoses: 296.22 - Major Depressive Disorder, Single Episode, Moderate    300.02 - Generalized Anxiety Disorder  ; V71.09 - No Diagnosis  Psychosocial / Contextual Factors: relational conflict    Referral / Collaboration:  Referral to another professional/service is not indicated at this time..    Anticipated number of session or this episode of care: ongoing as needed    Measurable Treatment Goal(s) related to diagnosis / functional impairment(s)   I will know I've met my goal when I have better tools to control my emotions   Goal 1: Client will achieve a significant reduction in PHQ-9 scores.   Objective #A (Client Action)   Client will identify cognitive distortions and negative self-talk contributing to depression.   Status: Continued: July 1, 2024   Intervention(s)   Therapist will teach about identification and strategies to counter negative self-talk and cognitive distortions.  Objective #B (Client Action)   Client will learn and integrate CBT/DBT strategies for more effectively managing emotions and relationships.   Status: Continued: July 1, 2024   Intervention(s)   Therapist will teach emotional regulation skills distress tolerance skills, interpersonal effectiveness skills and mindfulness skills.   Objective #C   Client will engage in positive self-care.  Status: Continued: July 1, 2024   Intervention(s)   Therapist will teach self-care goals:  Maintain balance in schedule (time for self/others, relaxation/activities, leisure/tasks, home/out of the house), assert needs  and set limits with others, challenge negative thoughts/use affirming and encouraging self-talk, engage with support people on regular basis, practice behavior activation behaviors (sleep hygiene, balanced eating, physical activity, medical needs, personal hygiene), acknowledge and accept your feelings.    Client has reviewed and agreed to the above plan.      Amrit Purcell MA, LMFT July 1, 2024

## 2024-11-11 ENCOUNTER — VIRTUAL VISIT (OUTPATIENT)
Dept: PSYCHOLOGY | Facility: CLINIC | Age: 29
End: 2024-11-11
Payer: COMMERCIAL

## 2024-11-11 DIAGNOSIS — F32.1 MAJOR DEPRESSIVE DISORDER, SINGLE EPISODE, MODERATE (H): Primary | ICD-10-CM

## 2024-11-11 DIAGNOSIS — F41.1 GAD (GENERALIZED ANXIETY DISORDER): ICD-10-CM

## 2024-11-11 PROCEDURE — 90834 PSYTX W PT 45 MINUTES: CPT | Mod: 93 | Performed by: MARRIAGE & FAMILY THERAPIST

## 2024-11-14 ASSESSMENT — ANXIETY QUESTIONNAIRES
IF YOU CHECKED OFF ANY PROBLEMS ON THIS QUESTIONNAIRE, HOW DIFFICULT HAVE THESE PROBLEMS MADE IT FOR YOU TO DO YOUR WORK, TAKE CARE OF THINGS AT HOME, OR GET ALONG WITH OTHER PEOPLE: SOMEWHAT DIFFICULT
GAD7 TOTAL SCORE: 6
7. FEELING AFRAID AS IF SOMETHING AWFUL MIGHT HAPPEN: NOT AT ALL
2. NOT BEING ABLE TO STOP OR CONTROL WORRYING: SEVERAL DAYS
1. FEELING NERVOUS, ANXIOUS, OR ON EDGE: SEVERAL DAYS
5. BEING SO RESTLESS THAT IT IS HARD TO SIT STILL: SEVERAL DAYS
GAD7 TOTAL SCORE: 6
6. BECOMING EASILY ANNOYED OR IRRITABLE: SEVERAL DAYS
3. WORRYING TOO MUCH ABOUT DIFFERENT THINGS: SEVERAL DAYS

## 2024-11-14 ASSESSMENT — COLUMBIA-SUICIDE SEVERITY RATING SCALE - C-SSRS
TOTAL  NUMBER OF ABORTED OR SELF INTERRUPTED ATTEMPTS SINCE LAST CONTACT: NO
SUICIDE, SINCE LAST CONTACT: NO
1. SINCE LAST CONTACT, HAVE YOU WISHED YOU WERE DEAD OR WISHED YOU COULD GO TO SLEEP AND NOT WAKE UP?: NO
TOTAL  NUMBER OF INTERRUPTED ATTEMPTS SINCE LAST CONTACT: NO
ATTEMPT SINCE LAST CONTACT: NO
6. HAVE YOU EVER DONE ANYTHING, STARTED TO DO ANYTHING, OR PREPARED TO DO ANYTHING TO END YOUR LIFE?: NO
2. HAVE YOU ACTUALLY HAD ANY THOUGHTS OF KILLING YOURSELF?: NO

## 2024-11-14 ASSESSMENT — PATIENT HEALTH QUESTIONNAIRE - PHQ9
SUM OF ALL RESPONSES TO PHQ QUESTIONS 1-9: 8
5. POOR APPETITE OR OVEREATING: SEVERAL DAYS

## 2024-11-14 NOTE — PROGRESS NOTES
[facility]                                     Progress Note    Patient Name: Keon Davis Date: 11/11/24       Service Type: Individual      Session Start Time: 4:30  Session End Time: 5:16     Session Length: 38 - 52 minutes    Session #: 46    Attendees: Client attended alone    Service Modality:  Video Visit:      Provider verified identity through the following two step process.  Patient provided:  Patient is known previously to provider    Telemedicine Visit: The patient's condition can be safely assessed and treated via synchronous audio and visual telemedicine encounter.      Reason for Telemedicine Visit: Patient has requested telehealth visit    Originating Site (Patient Location): Patient's home    Distant Site (Provider Location): Provider Remote Setting- Home Office    Consent:  The patient/guardian has verbally consented to: the potential risks and benefits of telemedicine (video visit) versus in person care; bill my insurance or make self-payment for services provided; and responsibility for payment of non-covered services.     Mode of Communication:  Video Conference via Doximity    Distant Location (Provider):  Off-site    As the provider I attest to compliance with applicable laws and regulations related to telemedicine.     Treatment Plan Last Reviewed: November 11, 2024        DATA  Interactive Complexity: No  Crisis: No             Progress Since Last Session (Related to Symptoms / Goals / Homework):   Symptoms: Improving .    Homework: Achieved / completed to satisfaction      Episode of Care Goals: Satisfactory progress - ACTION (Actively working towards change); Intervened by reinforcing change plan / affirming steps taken     Current / Ongoing Stressors and Concerns:   - seasonal lower mood   - job stress    - relationship conflict     - hypervigilance     Treatment Objective(s) Addressed in This Session:   Client will learn and integrate CBT/DBT  strategies for more effectively managing emotions and relationships.      Intervention:  Taught/reviewed mindfulness and present moment (how/what) skills and strategies for daily use. Client reports he and his wife have agreed to divorce terms and will soon file the agreement. He says he has a new apartment and will move in soon. He reports his new relationship is going well and some of their first conflict points have been well handled. Client reports intense emotions surrounding the recent national election. Processed emotions in session, validated, supportive counseling. Guidance offered to better utilize client's coping skills.    The following assessments were completed by patient for this visit:   PHQ9:       2/16/2021     9:38 AM 6/28/2021     3:47 PM 10/5/2021     1:10 PM 5/25/2023     3:08 PM 12/20/2023    12:56 PM 7/1/2024    12:48 PM 11/14/2024     1:28 PM   PHQ-9 SCORE   PHQ-9 Total Score MyChart      14 (Moderate depression)    PHQ-9 Total Score 7 9 6 6 7 14 8     GAD7:       10/5/2021     1:10 PM 5/25/2023     3:08 PM 12/20/2023    12:56 PM 7/1/2024    12:50 PM 8/9/2024     8:27 PM 8/26/2024     8:58 AM 11/14/2024     1:28 PM   SIOBHAN-7 SCORE   Total Score    15 (severe anxiety) 14 (moderate anxiety) 9 (mild anxiety)    Total Score 9 6 8 15 14 9 6     [place] Suicide Severity Rating Scale (Short Version)      2/16/2021     9:39 AM 6/28/2021     3:48 PM 10/5/2021     1:11 PM 5/25/2023     3:08 PM 12/20/2023    12:57 PM 7/3/2024     1:13 PM 11/14/2024     1:29 PM   [place] Suicide Severity Rating (Short Version)   Over the past 2 weeks have you felt down, depressed, or hopeless? yes yes yes       Over the past 2 weeks have you had thoughts of killing yourself? no no no       Have you ever attempted to kill yourself? no no no       1. Wish to be Dead (Since Last Contact)    N N N N   2. Non-Specific Active Suicidal Thoughts (Since Last Contact)    N N N N   Actual Attempt (Since Last Contact)    N N N N    Has subject engaged in non-suicidal self-injurious behavior? (Since Last Contact)    N N N N   Interrupted Attempts (Since Last Contact)    N N N N   Aborted or Self-Interrupted Attempt (Since Last Contact)    N N N N   Preparatory Acts or Behavior (Since Last Contact)    N N N N   Suicide (Since Last Contact)    N N N N   Calculated C-SSRS Risk Score (Since Last Contact)    No Risk Indicated No Risk Indicated No Risk Indicated No Risk Indicated     PROMIS 10-Global Health (only subscores and total score):       7/1/2024    12:51 PM 9/30/2024     2:17 PM 11/14/2024     1:28 PM   PROMIS-10 Scores Only   Global Mental Health Score 11 16    16 16   Global Physical Health Score 16 18    18    PROMIS TOTAL - SUBSCORES 27 34    34            ASSESSMENT: Current Emotional / Mental Status (status of significant symptoms):   Risk status (Self / Other harm or suicidal ideation)   Patient denies current fears or concerns for personal safety.   Patient denies current or recent suicidal ideation or behaviors.   Patient denies current or recent homicidal ideation or behaviors.   Patient denies current or recent self injurious behavior or ideation.   Patient denies other safety concerns.   Patient reports there has been no change in risk factors since their last session.     Patient reports there has been no change in protective factors since their last session.     Recommended that patient call 911 or go to the local ED should there be a change in any of these risk factors.     Appearance:   Appropriate    Eye Contact:   Good    Psychomotor Behavior: Normal    Attitude:   Cooperative  Friendly   Orientation:   All   Speech    Rate / Production: Normal     Volume:  Normal    Mood:    Normal   Affect:    Appropriate    Thought Content:  Clear    Thought Form:  Coherent  Logical    Insight:    Good      Medication Review:   No current psychiatric medications prescribed.      Medication Compliance:   NA     Changes in Health  Issues:   None reported     Chemical Use Review:   Substance Use: Chemical use reviewed, no active concerns identified      Tobacco Use: No current tobacco use.       Diagnosis:   Major Depressive Disorder, Single Episode, Moderate     Generalized Anxiety Disorder       Collateral Reports Completed:   Not Applicable    PLAN: (Client Tasks / Therapist Tasks / Other)  Client will use mindfulness and present moment (how/what) skills and strategies daily.      Amrit Purcell MA, LMFT                                                       ________________________________________________________________________                                                 Treatment Plan    Client's Name: Keon Davis  YOB: 1995    Date: November 11, 2024       DSM-V Diagnoses: 296.22 - Major Depressive Disorder, Single Episode, Moderate    300.02 - Generalized Anxiety Disorder  ; V71.09 - No Diagnosis  Psychosocial / Contextual Factors: relational conflict    Referral / Collaboration:  Referral to another professional/service is not indicated at this time..    Anticipated number of session or this episode of care: ongoing as needed    Measurable Treatment Goal(s) related to diagnosis / functional impairment(s)   I will know I've met my goal when I have better tools to control my emotions   Goal 1: Client will achieve a significant reduction in PHQ-9 scores.   Objective #A (Client Action)   Client will identify cognitive distortions and negative self-talk contributing to depression.   Status: Continued: November 11, 2024   Intervention(s)   Therapist will teach about identification and strategies to counter negative self-talk and cognitive distortions.  Objective #B (Client Action)   Client will learn and integrate CBT/DBT strategies for more effectively managing emotions and relationships.   Status: Continued: November 11, 2024   Intervention(s)   Therapist will teach emotional regulation skills distress  tolerance skills, interpersonal effectiveness skills and mindfulness skills.   Objective #C   Client will engage in positive self-care.  Status: Continued: November 11, 2024   Intervention(s)   Therapist will teach self-care goals:  Maintain balance in schedule (time for self/others, relaxation/activities, leisure/tasks, home/out of the house), assert needs and set limits with others, challenge negative thoughts/use affirming and encouraging self-talk, engage with support people on regular basis, practice behavior activation behaviors (sleep hygiene, balanced eating, physical activity, medical needs, personal hygiene), acknowledge and accept your feelings.    Client has reviewed and agreed to the above plan.      Amrit Purcell MA, LMFT November 11, 2024

## 2025-01-04 ASSESSMENT — ANXIETY QUESTIONNAIRES
GAD7 TOTAL SCORE: 6
6. BECOMING EASILY ANNOYED OR IRRITABLE: NOT AT ALL
4. TROUBLE RELAXING: SEVERAL DAYS
GAD7 TOTAL SCORE: 6
7. FEELING AFRAID AS IF SOMETHING AWFUL MIGHT HAPPEN: NOT AT ALL
1. FEELING NERVOUS, ANXIOUS, OR ON EDGE: MORE THAN HALF THE DAYS
IF YOU CHECKED OFF ANY PROBLEMS ON THIS QUESTIONNAIRE, HOW DIFFICULT HAVE THESE PROBLEMS MADE IT FOR YOU TO DO YOUR WORK, TAKE CARE OF THINGS AT HOME, OR GET ALONG WITH OTHER PEOPLE: SOMEWHAT DIFFICULT
2. NOT BEING ABLE TO STOP OR CONTROL WORRYING: SEVERAL DAYS
3. WORRYING TOO MUCH ABOUT DIFFERENT THINGS: MORE THAN HALF THE DAYS
8. IF YOU CHECKED OFF ANY PROBLEMS, HOW DIFFICULT HAVE THESE MADE IT FOR YOU TO DO YOUR WORK, TAKE CARE OF THINGS AT HOME, OR GET ALONG WITH OTHER PEOPLE?: SOMEWHAT DIFFICULT
5. BEING SO RESTLESS THAT IT IS HARD TO SIT STILL: NOT AT ALL

## 2025-01-07 ENCOUNTER — VIRTUAL VISIT (OUTPATIENT)
Dept: PSYCHOLOGY | Facility: CLINIC | Age: 30
End: 2025-01-07

## 2025-01-07 DIAGNOSIS — F41.1 GAD (GENERALIZED ANXIETY DISORDER): ICD-10-CM

## 2025-01-07 DIAGNOSIS — F32.1 MAJOR DEPRESSIVE DISORDER, SINGLE EPISODE, MODERATE (H): Primary | ICD-10-CM

## 2025-01-07 PROCEDURE — 90834 PSYTX W PT 45 MINUTES: CPT | Mod: 93 | Performed by: MARRIAGE & FAMILY THERAPIST

## 2025-01-09 NOTE — PROGRESS NOTES
[facility]                                     Progress Note    Patient Name: Keon Davis Date: 1/07/25       Service Type: Individual      Session Start Time: 9:00  Session End Time: 9:46     Session Length: 38 - 52 minutes    Session #: 47    Attendees: Client attended alone    Service Modality:  Video Visit:      Provider verified identity through the following two step process.  Patient provided:  Patient is known previously to provider    Telemedicine Visit: The patient's condition can be safely assessed and treated via synchronous audio and visual telemedicine encounter.      Reason for Telemedicine Visit: Patient has requested telehealth visit    Originating Site (Patient Location): Patient's home    Distant Site (Provider Location): Provider Remote Setting- Home Office    Consent:  The patient/guardian has verbally consented to: the potential risks and benefits of telemedicine (video visit) versus in person care; bill my insurance or make self-payment for services provided; and responsibility for payment of non-covered services.     Mode of Communication:  Video Conference via Doximity    Distant Location (Provider):  Off-site    As the provider I attest to compliance with applicable laws and regulations related to telemedicine.     Treatment Plan Last Reviewed: November 11, 2024        DATA  Interactive Complexity: No  Crisis: No             Progress Since Last Session (Related to Symptoms / Goals / Homework):   Symptoms: Improving .    Homework: Achieved / completed to satisfaction      Episode of Care Goals: Satisfactory progress - ACTION (Actively working towards change); Intervened by reinforcing change plan / affirming steps taken     Current / Ongoing Stressors and Concerns:   - seasonal lower mood   - job stress    - relationship conflict     - hypervigilance     Treatment Objective(s) Addressed in This Session:   Client will learn and integrate CBT/DBT  strategies for more effectively managing emotions and relationships.      Intervention:  Taught/reviewed/role-played interpersonal effectiveness, communication, negotiation and boundary setting skills. Client reports he has been more anxious around his partner CARLY, particularly when she is using cannabis. He says they are communicating openly, which is a welcome change since his previous relationship. HE says having his own apartment now allows for some alone time he frequently needs. Discussed the need for reassurance in their relationship and how to help each other be confident. Processed emotions in session, validated, supportive counseling. Guidance offered to better utilize client's coping skills.    The following assessments were completed by patient for this visit:   PHQ9:       2/16/2021     9:38 AM 6/28/2021     3:47 PM 10/5/2021     1:10 PM 5/25/2023     3:08 PM 12/20/2023    12:56 PM 7/1/2024    12:48 PM 11/14/2024     1:28 PM   PHQ-9 SCORE   PHQ-9 Total Score MyChart      14 (Moderate depression)    PHQ-9 Total Score 7 9 6 6 7 14 8     GAD7:       5/25/2023     3:08 PM 12/20/2023    12:56 PM 7/1/2024    12:50 PM 8/9/2024     8:27 PM 8/26/2024     8:58 AM 11/14/2024     1:28 PM 1/4/2025     9:17 AM   SIOBHAN-7 SCORE   Total Score   15 (severe anxiety) 14 (moderate anxiety) 9 (mild anxiety)  6 (mild anxiety)   Total Score 6 8 15 14 9 6 6        Patient-reported     [place] Suicide Severity Rating Scale (Short Version)      2/16/2021     9:39 AM 6/28/2021     3:48 PM 10/5/2021     1:11 PM 5/25/2023     3:08 PM 12/20/2023    12:57 PM 7/3/2024     1:13 PM 11/14/2024     1:29 PM   [place] Suicide Severity Rating (Short Version)   Over the past 2 weeks have you felt down, depressed, or hopeless? yes yes yes       Over the past 2 weeks have you had thoughts of killing yourself? no no no       Have you ever attempted to kill yourself? no no no       1. Wish to be Dead (Since Last Contact)    N N N N   2.  Non-Specific Active Suicidal Thoughts (Since Last Contact)    N N N N   Actual Attempt (Since Last Contact)    N N N N   Has subject engaged in non-suicidal self-injurious behavior? (Since Last Contact)    N N N N   Interrupted Attempts (Since Last Contact)    N N N N   Aborted or Self-Interrupted Attempt (Since Last Contact)    N N N N   Preparatory Acts or Behavior (Since Last Contact)    N N N N   Suicide (Since Last Contact)    N N N N   Calculated C-SSRS Risk Score (Since Last Contact)    No Risk Indicated No Risk Indicated No Risk Indicated No Risk Indicated     PROMIS 10-Global Health (only subscores and total score):       7/1/2024    12:51 PM 9/30/2024     2:17 PM 11/14/2024     1:28 PM   PROMIS-10 Scores Only   Global Mental Health Score 11 16    16 16   Global Physical Health Score 16 18    18    PROMIS TOTAL - SUBSCORES 27 34    34            ASSESSMENT: Current Emotional / Mental Status (status of significant symptoms):   Risk status (Self / Other harm or suicidal ideation)   Patient denies current fears or concerns for personal safety.   Patient denies current or recent suicidal ideation or behaviors.   Patient denies current or recent homicidal ideation or behaviors.   Patient denies current or recent self injurious behavior or ideation.   Patient denies other safety concerns.   Patient reports there has been no change in risk factors since their last session.     Patient reports there has been no change in protective factors since their last session.     Recommended that patient call 911 or go to the local ED should there be a change in any of these risk factors.     Appearance:   Appropriate    Eye Contact:   Good    Psychomotor Behavior: Normal    Attitude:   Interested   Orientation:   All   Speech    Rate / Production: Normal     Volume:  Normal    Mood:    Anxious    Affect:    Appropriate    Thought Content:  Clear    Thought Form:  Coherent  Logical    Insight:    Good      Medication  Review:   No current psychiatric medications prescribed.      Medication Compliance:   NA     Changes in Health Issues:   None reported     Chemical Use Review:   Substance Use: Chemical use reviewed, no active concerns identified      Tobacco Use: No current tobacco use.       Diagnosis:   Major Depressive Disorder, Single Episode, Moderate     Generalized Anxiety Disorder       Collateral Reports Completed:   Not Applicable    PLAN: (Client Tasks / Therapist Tasks / Other)  Client will use interpersonal effectiveness, communication, negotiation and boundary setting skills with his partner      Amrit Purcell MA, LMFT                                                       ________________________________________________________________________                                                 Treatment Plan    Client's Name: Keon Davis  YOB: 1995    Date: November 11, 2024       DSM-V Diagnoses: 296.22 - Major Depressive Disorder, Single Episode, Moderate    300.02 - Generalized Anxiety Disorder  ; V71.09 - No Diagnosis  Psychosocial / Contextual Factors: relational conflict    Referral / Collaboration:  Referral to another professional/service is not indicated at this time..    Anticipated number of session or this episode of care: ongoing as needed    Measurable Treatment Goal(s) related to diagnosis / functional impairment(s)   I will know I've met my goal when I have better tools to control my emotions   Goal 1: Client will achieve a significant reduction in PHQ-9 scores.   Objective #A (Client Action)   Client will identify cognitive distortions and negative self-talk contributing to depression.   Status: Continued: November 11, 2024   Intervention(s)   Therapist will teach about identification and strategies to counter negative self-talk and cognitive distortions.  Objective #B (Client Action)   Client will learn and integrate CBT/DBT strategies for more effectively managing emotions  and relationships.   Status: Continued: November 11, 2024   Intervention(s)   Therapist will teach emotional regulation skills distress tolerance skills, interpersonal effectiveness skills and mindfulness skills.   Objective #C   Client will engage in positive self-care.  Status: Continued: November 11, 2024   Intervention(s)   Therapist will teach self-care goals:  Maintain balance in schedule (time for self/others, relaxation/activities, leisure/tasks, home/out of the house), assert needs and set limits with others, challenge negative thoughts/use affirming and encouraging self-talk, engage with support people on regular basis, practice behavior activation behaviors (sleep hygiene, balanced eating, physical activity, medical needs, personal hygiene), acknowledge and accept your feelings.    Client has reviewed and agreed to the above plan.      Amrit Purcell MA, LMFT November 11, 2024

## 2025-01-13 ENCOUNTER — VIRTUAL VISIT (OUTPATIENT)
Dept: PSYCHOLOGY | Facility: CLINIC | Age: 30
End: 2025-01-13
Payer: COMMERCIAL

## 2025-01-13 DIAGNOSIS — F41.1 GAD (GENERALIZED ANXIETY DISORDER): ICD-10-CM

## 2025-01-13 DIAGNOSIS — F32.1 MAJOR DEPRESSIVE DISORDER, SINGLE EPISODE, MODERATE (H): Primary | ICD-10-CM

## 2025-01-13 PROCEDURE — 90834 PSYTX W PT 45 MINUTES: CPT | Mod: 93 | Performed by: MARRIAGE & FAMILY THERAPIST

## 2025-01-16 NOTE — PROGRESS NOTES
[facility]                                     Progress Note    Patient Name: Keon Davis Date: 1/13/25       Service Type: Individual      Session Start Time: 1:30  Session End Time: 2:16     Session Length: 38 - 52 minutes    Session #: 48    Attendees: Client attended alone    Service Modality:  Video Visit:      Provider verified identity through the following two step process.  Patient provided:  Patient is known previously to provider    Telemedicine Visit: The patient's condition can be safely assessed and treated via synchronous audio and visual telemedicine encounter.      Reason for Telemedicine Visit: Patient has requested telehealth visit    Originating Site (Patient Location): Patient's home    Distant Site (Provider Location): Provider Remote Setting- Home Office    Consent:  The patient/guardian has verbally consented to: the potential risks and benefits of telemedicine (video visit) versus in person care; bill my insurance or make self-payment for services provided; and responsibility for payment of non-covered services.     Mode of Communication:  Video Conference via Doximity    Distant Location (Provider):  Off-site    As the provider I attest to compliance with applicable laws and regulations related to telemedicine.     Treatment Plan Last Reviewed: November 11, 2024        DATA  Interactive Complexity: No  Crisis: No             Progress Since Last Session (Related to Symptoms / Goals / Homework):   Symptoms: Improving .    Homework: Achieved / completed to satisfaction      Episode of Care Goals: Satisfactory progress - ACTION (Actively working towards change); Intervened by reinforcing change plan / affirming steps taken     Current / Ongoing Stressors and Concerns:   - seasonal lower mood   - job stress    - relationship conflict     - hypervigilance     Treatment Objective(s) Addressed in This Session:   Client will learn and integrate CBT/DBT  "strategies for more effectively managing emotions and relationships.      Intervention:  Taught/reviewed mindfulness and present moment (how/what) skills and strategies for daily use Client reports he has been able to use \"wise Mind\" strategies to minimize the anxiety he feels when his partner engages in behavior that triggers him. Chain analyses of pertinent examples. Processed emotions in session, validated, supportive counseling. Guidance offered to better utilize client's coping skills.    The following assessments were completed by patient for this visit:   PHQ9:       2/16/2021     9:38 AM 6/28/2021     3:47 PM 10/5/2021     1:10 PM 5/25/2023     3:08 PM 12/20/2023    12:56 PM 7/1/2024    12:48 PM 11/14/2024     1:28 PM   PHQ-9 SCORE   PHQ-9 Total Score MyChart      14 (Moderate depression)    PHQ-9 Total Score 7 9 6 6 7 14 8     GAD7:       5/25/2023     3:08 PM 12/20/2023    12:56 PM 7/1/2024    12:50 PM 8/9/2024     8:27 PM 8/26/2024     8:58 AM 11/14/2024     1:28 PM 1/4/2025     9:17 AM   SIOBHAN-7 SCORE   Total Score   15 (severe anxiety) 14 (moderate anxiety) 9 (mild anxiety)  6 (mild anxiety)   Total Score 6 8 15 14 9 6 6        Patient-reported     [place]amar Suicide Severity Rating Scale (Short Version)      2/16/2021     9:39 AM 6/28/2021     3:48 PM 10/5/2021     1:11 PM 5/25/2023     3:08 PM 12/20/2023    12:57 PM 7/3/2024     1:13 PM 11/14/2024     1:29 PM   Lamar Suicide Severity Rating (Short Version)   Over the past 2 weeks have you felt down, depressed, or hopeless? yes yes yes       Over the past 2 weeks have you had thoughts of killing yourself? no no no       Have you ever attempted to kill yourself? no no no       1. Wish to be Dead (Since Last Contact)    N N N N   2. Non-Specific Active Suicidal Thoughts (Since Last Contact)    N N N N   Actual Attempt (Since Last Contact)    N N N N   Has subject engaged in non-suicidal self-injurious behavior? (Since Last Contact)    N N N N "   Interrupted Attempts (Since Last Contact)    N N N N   Aborted or Self-Interrupted Attempt (Since Last Contact)    N N N N   Preparatory Acts or Behavior (Since Last Contact)    N N N N   Suicide (Since Last Contact)    N N N N   Calculated C-SSRS Risk Score (Since Last Contact)    No Risk Indicated No Risk Indicated No Risk Indicated No Risk Indicated     PROMIS 10-Global Health (only subscores and total score):       7/1/2024    12:51 PM 9/30/2024     2:17 PM 11/14/2024     1:28 PM   PROMIS-10 Scores Only   Global Mental Health Score 11 16    16 16   Global Physical Health Score 16 18    18    PROMIS TOTAL - SUBSCORES 27 34    34            ASSESSMENT: Current Emotional / Mental Status (status of significant symptoms):   Risk status (Self / Other harm or suicidal ideation)   Patient denies current fears or concerns for personal safety.   Patient denies current or recent suicidal ideation or behaviors.   Patient denies current or recent homicidal ideation or behaviors.   Patient denies current or recent self injurious behavior or ideation.   Patient denies other safety concerns.   Patient reports there has been no change in risk factors since their last session.     Patient reports there has been no change in protective factors since their last session.     Recommended that patient call 911 or go to the local ED should there be a change in any of these risk factors.     Appearance:   Appropriate    Eye Contact:   Good    Psychomotor Behavior: Normal    Attitude:   Attentive   Orientation:   All   Speech    Rate / Production: Normal     Volume:  Normal    Mood:    Anxious    Affect:    Appropriate    Thought Content:  Clear    Thought Form:  Coherent  Logical    Insight:    Good      Medication Review:   No current psychiatric medications prescribed.      Medication Compliance:   NA     Changes in Health Issues:   None reported     Chemical Use Review:   Substance Use: Chemical use reviewed, no active concerns  identified      Tobacco Use: No current tobacco use.       Diagnosis:   Major Depressive Disorder, Single Episode, Moderate     Generalized Anxiety Disorder       Collateral Reports Completed:   Not Applicable    PLAN: (Client Tasks / Therapist Tasks / Other)  Client will use mindfulness and present moment (how/what) skills and strategies daily to cope with anxiety.    Amrit Purcell MA, LMFT                                                       ________________________________________________________________________                                                 Treatment Plan    Client's Name: Keon Davis  YOB: 1995    Date: November 11, 2024       DSM-V Diagnoses: 296.22 - Major Depressive Disorder, Single Episode, Moderate    300.02 - Generalized Anxiety Disorder  ; V71.09 - No Diagnosis  Psychosocial / Contextual Factors: relational conflict    Referral / Collaboration:  Referral to another professional/service is not indicated at this time..    Anticipated number of session or this episode of care: ongoing as needed    Measurable Treatment Goal(s) related to diagnosis / functional impairment(s)   I will know I've met my goal when I have better tools to control my emotions   Goal 1: Client will achieve a significant reduction in PHQ-9 scores.   Objective #A (Client Action)   Client will identify cognitive distortions and negative self-talk contributing to depression.   Status: Continued: November 11, 2024   Intervention(s)   Therapist will teach about identification and strategies to counter negative self-talk and cognitive distortions.  Objective #B (Client Action)   Client will learn and integrate CBT/DBT strategies for more effectively managing emotions and relationships.   Status: Continued: November 11, 2024   Intervention(s)   Therapist will teach emotional regulation skills distress tolerance skills, interpersonal effectiveness skills and mindfulness skills.   Objective #C    Client will engage in positive self-care.  Status: Continued: November 11, 2024   Intervention(s)   Therapist will teach self-care goals:  Maintain balance in schedule (time for self/others, relaxation/activities, leisure/tasks, home/out of the house), assert needs and set limits with others, challenge negative thoughts/use affirming and encouraging self-talk, engage with support people on regular basis, practice behavior activation behaviors (sleep hygiene, balanced eating, physical activity, medical needs, personal hygiene), acknowledge and accept your feelings.    Client has reviewed and agreed to the above plan.      Amrit Purcell MA, LMFT November 11, 2024

## 2025-06-27 NOTE — PROGRESS NOTES
[facility]                                     Progress Note    Patient Name: Keon Davis Date: 7/15/24       Service Type: Individual      Session Start Time: 10:00  Session End Time: 10:45     Session Length: 45 min    Session #: 40    Attendees: Client attended alone    Service Modality:  Video Visit:      Provider verified identity through the following two step process.  Patient provided:  Patient is known previously to provider    Telemedicine Visit: The patient's condition can be safely assessed and treated via synchronous audio and visual telemedicine encounter.      Reason for Telemedicine Visit: Patient has requested telehealth visit    Originating Site (Patient Location): Patient's home    Distant Site (Provider Location): Provider Remote Setting- Home Office    Consent:  The patient/guardian has verbally consented to: the potential risks and benefits of telemedicine (video visit) versus in person care; bill my insurance or make self-payment for services provided; and responsibility for payment of non-covered services.     Mode of Communication:  Video Conference via Doximity    Distant Location (Provider):  Off-site    As the provider I attest to compliance with applicable laws and regulations related to telemedicine.     Treatment Plan Last Reviewed: July 1, 2024       DATA  Interactive Complexity: No  Crisis: No             Progress Since Last Session (Related to Symptoms / Goals / Homework):   Symptoms: Worsening .    Homework: Achieved / completed to satisfaction      Episode of Care Goals: Satisfactory progress - MAINTENANCE (Working to maintain change, with risk of relapse); Intervened by continuing to positively reinforce healthy behavior choice      Current / Ongoing Stressors and Concerns:   - seasonal lower mood   - job stress    - relationship conflict     - hypervigilance     Treatment Objective(s) Addressed in This Session:   Client will learn and  integrate CBT/DBT strategies for more effectively managing emotions and relationships.      Intervention:  Reviewed emotion regulation, distress tolerance, interpersonal effectiveness, mindfulness and self-care skills and strategies that have been useful to improve symptoms.  Taught/reviewed/role-played interpersonal effectiveness, communication, negotiation and boundary setting skills. Client reports he discussed his dissatisfaction with their relationship to his wife. He says he does not feel acknowledged and validated in their marriage. He agreed to start couple therapy to see if enough change can occur. He says he would like for her to listen more and more actively, and start to share more activities together. Processed emotions in session, validated, supportive counseling. Guidance offered to better utilize client's coping skills.    The following assessments were completed by patient for this visit: none  PHQ9:       5/1/2020     3:33 PM 2/16/2021     9:38 AM 6/28/2021     3:47 PM 10/5/2021     1:10 PM 5/25/2023     3:08 PM 12/20/2023    12:56 PM 7/1/2024    12:48 PM   PHQ-9 SCORE   PHQ-9 Total Score MyChart       14 (Moderate depression)   PHQ-9 Total Score 7 7 9 6 6 7 14     GAD7:       5/1/2020     3:33 PM 2/16/2021     9:38 AM 6/28/2021     3:47 PM 10/5/2021     1:10 PM 5/25/2023     3:08 PM 12/20/2023    12:56 PM 7/1/2024    12:50 PM   SIOBHAN-7 SCORE   Total Score       15 (severe anxiety)   Total Score 9 7 10 9 6 8 15     Phillips Suicide Severity Rating Scale (Short Version)      5/1/2020     3:35 PM 2/16/2021     9:39 AM 6/28/2021     3:48 PM 10/5/2021     1:11 PM 5/25/2023     3:08 PM 12/20/2023    12:57 PM 7/3/2024     1:13 PM   Phillips Suicide Severity Rating (Short Version)   Over the past 2 weeks have you felt down, depressed, or hopeless? yes yes yes yes      Over the past 2 weeks have you had thoughts of killing yourself? no no no no      Have you ever attempted to kill yourself? no no no no       1. Wish to be Dead (Since Last Contact)     N N N   2. Non-Specific Active Suicidal Thoughts (Since Last Contact)     N N N   Actual Attempt (Since Last Contact)     N N N   Has subject engaged in non-suicidal self-injurious behavior? (Since Last Contact)     N N N   Interrupted Attempts (Since Last Contact)     N N N   Aborted or Self-Interrupted Attempt (Since Last Contact)     N N N   Preparatory Acts or Behavior (Since Last Contact)     N N N   Suicide (Since Last Contact)     N N N   Calculated C-SSRS Risk Score (Since Last Contact)     No Risk Indicated No Risk Indicated No Risk Indicated          ASSESSMENT: Current Emotional / Mental Status (status of significant symptoms):   Risk status (Self / Other harm or suicidal ideation)   Client denies current fears or concerns for personal safety.   Client denies current or recent suicidal ideation or behaviors.   Client denies current or recent homicidal ideation or behaviors.   Client denies current or recent self injurious behavior or ideation.   Client denies other safety concerns.   A safety and risk management has not been developed at this time.  Client was given the Suicide Prevention National Hotline, Text [place] 686697, the [facility] Crisis Number, or encouraged to Call 911 should there be a change in these risk factors.       Appearance:   Appropriate    Eye Contact:   Good    Psychomotor Behavior: Normal    Attitude:   Interested   Orientation:   All   Speech    Rate / Production: Normal     Volume:  Normal    Mood:    Normal   Affect:    Appropriate    Thought Content:  Clear    Thought Form:  Coherent  Logical    Insight:    Good      Medication Review:   No current psychiatric medications prescribed.      Medication Compliance:   NA     Changes in Health Issues:   None reported     Chemical Use Review:   Substance Use: Chemical use reviewed, no active concerns identified      Tobacco Use: No current tobacco use.       Diagnosis:   Major Depressive  Disorder, Single Episode, Moderate     Generalized Anxiety Disorder       Collateral Reports Completed:   Not Applicable    PLAN: (Client Tasks / Therapist Tasks / Other)  Client will use emotion regulation, distress tolerance, interpersonal effectiveness, mindfulness and self-care skills and strategies that have been useful to improve symptoms. He will reach out to schedule couple therapy for his relationship.      Amrit Purcell MA, LMFT                                                       ________________________________________________________________________                                                 Treatment Plan    Client's Name: Keon Davis  YOB: 1995    Date: July 1, 2024       DSM-V Diagnoses: 296.22 - Major Depressive Disorder, Single Episode, Moderate    300.02 - Generalized Anxiety Disorder  ; V71.09 - No Diagnosis  Psychosocial / Contextual Factors: relational conflict    Referral / Collaboration:  Referral to another professional/service is not indicated at this time..    Anticipated number of session or this episode of care: ongoing as needed    Measurable Treatment Goal(s) related to diagnosis / functional impairment(s)   I will know I've met my goal when I have better tools to control my emotions   Goal 1: Client will achieve a significant reduction in PHQ-9 scores.   Objective #A (Client Action)   Client will identify cognitive distortions and negative self-talk contributing to depression.   Status: Continued: July 1, 2024   Intervention(s)   Therapist will teach about identification and strategies to counter negative self-talk and cognitive distortions.  Objective #B (Client Action)   Client will learn and integrate CBT/DBT strategies for more effectively managing emotions and relationships.   Status: Continued: July 1, 2024   Intervention(s)   Therapist will teach emotional regulation skills distress tolerance skills, interpersonal effectiveness skills and  mindfulness skills.   Objective #C   Client will engage in positive self-care.  Status: Continued: July 1, 2024   Intervention(s)   Therapist will teach self-care goals:  Maintain balance in schedule (time for self/others, relaxation/activities, leisure/tasks, home/out of the house), assert needs and set limits with others, challenge negative thoughts/use affirming and encouraging self-talk, engage with support people on regular basis, practice behavior activation behaviors (sleep hygiene, balanced eating, physical activity, medical needs, personal hygiene), acknowledge and accept your feelings.    Client has reviewed and agreed to the above plan.      Amrit Purcell MA, LMFT July 1, 2024      No